# Patient Record
Sex: MALE | Race: BLACK OR AFRICAN AMERICAN | Employment: FULL TIME | URBAN - METROPOLITAN AREA
[De-identification: names, ages, dates, MRNs, and addresses within clinical notes are randomized per-mention and may not be internally consistent; named-entity substitution may affect disease eponyms.]

---

## 2019-10-31 ENCOUNTER — HOSPITAL ENCOUNTER (EMERGENCY)
Age: 59
Discharge: HOME | End: 2019-11-01
Attending: EMERGENCY MEDICINE
Payer: COMMERCIAL

## 2019-10-31 VITALS
OXYGEN SATURATION: 96 % | SYSTOLIC BLOOD PRESSURE: 132 MMHG | BODY MASS INDEX: 24.87 KG/M2 | DIASTOLIC BLOOD PRESSURE: 72 MMHG | HEART RATE: 100 BPM | TEMPERATURE: 99.5 F | WEIGHT: 193.78 LBS | RESPIRATION RATE: 16 BRPM | HEIGHT: 74 IN

## 2019-10-31 DIAGNOSIS — M54.50 ACUTE EXACERBATION OF CHRONIC LOW BACK PAIN: Primary | ICD-10-CM

## 2019-10-31 DIAGNOSIS — G89.29 ACUTE EXACERBATION OF CHRONIC LOW BACK PAIN: Primary | ICD-10-CM

## 2019-10-31 PROCEDURE — 96374 THER/PROPH/DIAG INJ IV PUSH: CPT

## 2019-10-31 PROCEDURE — 99283 EMERGENCY DEPT VISIT LOW MDM: CPT

## 2019-10-31 PROCEDURE — 96372 THER/PROPH/DIAG INJ SC/IM: CPT

## 2019-10-31 PROCEDURE — 6360000002 HC RX W HCPCS: Performed by: EMERGENCY MEDICINE

## 2019-10-31 PROCEDURE — 96375 TX/PRO/DX INJ NEW DRUG ADDON: CPT

## 2019-10-31 RX ORDER — DEXAMETHASONE SODIUM PHOSPHATE 4 MG/ML
8 INJECTION, SOLUTION INTRA-ARTICULAR; INTRALESIONAL; INTRAMUSCULAR; INTRAVENOUS; SOFT TISSUE ONCE
Status: COMPLETED | OUTPATIENT
Start: 2019-10-31 | End: 2019-10-31

## 2019-10-31 RX ORDER — TRAMADOL HYDROCHLORIDE 50 MG/1
50 TABLET ORAL PRN
COMMUNITY
Start: 2019-10-03 | End: 2022-05-27

## 2019-10-31 RX ORDER — ONDANSETRON 2 MG/ML
4 INJECTION INTRAMUSCULAR; INTRAVENOUS ONCE
Status: COMPLETED | OUTPATIENT
Start: 2019-10-31 | End: 2019-10-31

## 2019-10-31 RX ORDER — LISINOPRIL 20 MG/1
20 TABLET ORAL DAILY
COMMUNITY
Start: 2019-10-11 | End: 2022-05-27

## 2019-10-31 RX ORDER — OXYCODONE HYDROCHLORIDE AND ACETAMINOPHEN 5; 325 MG/1; MG/1
1 TABLET ORAL EVERY 6 HOURS PRN
Qty: 28 TABLET | Refills: 0 | Status: SHIPPED | OUTPATIENT
Start: 2019-10-31 | End: 2019-11-07

## 2019-10-31 RX ADMIN — HYDROMORPHONE HYDROCHLORIDE 1 MG: 1 INJECTION, SOLUTION INTRAMUSCULAR; INTRAVENOUS; SUBCUTANEOUS at 23:11

## 2019-10-31 RX ADMIN — DEXAMETHASONE SODIUM PHOSPHATE 8 MG: 4 INJECTION, SOLUTION INTRAMUSCULAR; INTRAVENOUS at 23:11

## 2019-10-31 RX ADMIN — ONDANSETRON 4 MG: 2 INJECTION INTRAMUSCULAR; INTRAVENOUS at 23:11

## 2019-10-31 SDOH — HEALTH STABILITY: MENTAL HEALTH: HOW OFTEN DO YOU HAVE A DRINK CONTAINING ALCOHOL?: NEVER

## 2019-10-31 ASSESSMENT — PAIN SCALES - GENERAL
PAINLEVEL_OUTOF10: 6
PAINLEVEL_OUTOF10: 5
PAINLEVEL_OUTOF10: 10
PAINLEVEL_OUTOF10: 10

## 2019-10-31 ASSESSMENT — PAIN DESCRIPTION - ORIENTATION: ORIENTATION: LOWER

## 2019-10-31 ASSESSMENT — PAIN DESCRIPTION - DESCRIPTORS: DESCRIPTORS: ACHING;SPASM

## 2019-10-31 ASSESSMENT — PAIN DESCRIPTION - PAIN TYPE
TYPE: ACUTE PAIN;CHRONIC PAIN
TYPE: CHRONIC PAIN

## 2019-10-31 ASSESSMENT — PAIN DESCRIPTION - LOCATION
LOCATION: BACK
LOCATION: BACK

## 2019-10-31 ASSESSMENT — PAIN DESCRIPTION - FREQUENCY: FREQUENCY: INTERMITTENT

## 2021-10-15 PROBLEM — M54.16 LUMBAR RADICULITIS: Status: ACTIVE | Noted: 2017-11-14

## 2022-08-09 ENCOUNTER — OFFICE VISIT (OUTPATIENT)
Dept: PAIN MANAGEMENT | Age: 62
End: 2022-08-09
Payer: COMMERCIAL

## 2022-08-09 VITALS
HEART RATE: 68 BPM | BODY MASS INDEX: 27.34 KG/M2 | SYSTOLIC BLOOD PRESSURE: 124 MMHG | DIASTOLIC BLOOD PRESSURE: 74 MMHG | WEIGHT: 213 LBS | HEIGHT: 74 IN

## 2022-08-09 DIAGNOSIS — M54.16 LUMBAR RADICULOPATHY: Primary | ICD-10-CM

## 2022-08-09 DIAGNOSIS — Z51.81 ENCOUNTER FOR THERAPEUTIC DRUG MONITORING: ICD-10-CM

## 2022-08-09 DIAGNOSIS — G89.4 CHRONIC PAIN SYNDROME: ICD-10-CM

## 2022-08-09 DIAGNOSIS — Z79.891 ENCOUNTER FOR LONG-TERM OPIATE ANALGESIC USE: ICD-10-CM

## 2022-08-09 PROCEDURE — 99204 OFFICE O/P NEW MOD 45 MIN: CPT | Performed by: NURSE PRACTITIONER

## 2022-08-09 RX ORDER — HYDROCODONE BITARTRATE AND ACETAMINOPHEN 7.5; 325 MG/1; MG/1
1 TABLET ORAL 2 TIMES DAILY
Qty: 56 TABLET | Refills: 0 | Status: SHIPPED | OUTPATIENT
Start: 2022-08-09 | End: 2022-09-06

## 2022-08-09 RX ORDER — AMITRIPTYLINE HYDROCHLORIDE 25 MG/1
25 TABLET, FILM COATED ORAL NIGHTLY
Qty: 30 TABLET | Refills: 0 | Status: SHIPPED | OUTPATIENT
Start: 2022-08-09 | End: 2022-09-09 | Stop reason: SDUPTHER

## 2022-08-09 ASSESSMENT — ENCOUNTER SYMPTOMS
BACK PAIN: 1
SHORTNESS OF BREATH: 0
CONSTIPATION: 0
CHEST TIGHTNESS: 0

## 2022-08-09 NOTE — PROGRESS NOTES
Genitourinary:  Negative for decreased urine volume. Musculoskeletal:  Positive for arthralgias, back pain and gait problem. Negative for neck pain and neck stiffness. Neurological:  Negative for dizziness, speech difficulty, weakness and headaches. Psychiatric/Behavioral:  Positive for sleep disturbance. Negative for self-injury and suicidal ideas. The patient is not nervous/anxious. Physical Exam  Constitutional:       Appearance: Normal appearance. HENT:      Head: Normocephalic and atraumatic. Mouth/Throat:      Mouth: Mucous membranes are moist.      Pharynx: Oropharynx is clear. Eyes:      General: No scleral icterus. Pupils: Pupils are equal, round, and reactive to light. Cardiovascular:      Rate and Rhythm: Normal rate and regular rhythm. Pulses: Normal pulses. Heart sounds: Normal heart sounds. No murmur heard. Pulmonary:      Effort: Pulmonary effort is normal.      Breath sounds: Normal breath sounds. Abdominal:      General: Abdomen is flat. Bowel sounds are normal.      Palpations: Abdomen is soft. Musculoskeletal:         General: Tenderness (L3-L5 facets/lumbar paraspinals) present. No swelling. Cervical back: Normal range of motion and neck supple. No tenderness. Right lower leg: No edema. Left lower leg: No edema. Comments: Limited lumbar and flexion r/t pain   Skin:     General: Skin is warm and dry. Capillary Refill: Capillary refill takes 2 to 3 seconds. Neurological:      General: No focal deficit present. Mental Status: He is alert and oriented to person, place, and time. Mental status is at baseline. Sensory: No sensory deficit. Motor: Weakness (RLE 4/5, LLE 5/5) present. Gait: Gait normal.      Deep Tendon Reflexes: Reflexes normal.   Psychiatric:         Mood and Affect: Mood normal.         Behavior: Behavior normal.         Thought Content:  Thought content normal.         Judgment: Judgment normal. /74   Pulse 68   Ht 6' 1.5\" (1.867 m)   Wt 213 lb (96.6 kg)   BMI 27.72 kg/m²      ASSESSMENT:    1. Lumbar radiculopathy    2. Encounter for therapeutic drug monitoring    3. Chronic pain syndrome    4. Encounter for long-term opiate analgesic use         Lab Results   Component Value Date    WBC 5.3 02/18/2022    HGB 11.4 (L) 02/18/2022    HCT 36.0 (L) 02/18/2022    MCV 73 (L) 02/18/2022     02/18/2022     Lab Results   Component Value Date/Time     02/18/2022 12:00 AM    K 4.2 02/18/2022 12:00 AM     02/18/2022 12:00 AM    CO2 22 02/18/2022 12:00 AM    BUN 8 02/18/2022 12:00 AM    CREATININE 0.93 02/18/2022 12:00 AM    GLUCOSE 84 02/18/2022 12:00 AM    CALCIUM 9.7 02/18/2022 12:00 AM      Lab Results   Component Value Date    TSH 1.180 02/18/2022       IMAGING:  MRI at [facility] 8 months ago, will obtain records     Carol Gonzalez MD - 11/19/2021   Formatting of this note might be different from the original.   HISTORY: chronic pain  Low back pain, unspecified;Spondylosis without myelopathy or radiculopathy, lumbosacral region   COMPARISON: 2017   NOTE:  If there are questions about the content of this report, please contact [facility] radiology by calling [phone]     FINDINGS:   ALIGNMENT:  Unremarkable   BONES:  Unremarkable. No aggressive osseous lesion or fracture   POST-SURGICAL FINDINGS:  None   DISC LEVELS:  Severe disc narrowing and bulky spurring L4-5 and L5-S1   FACET JOINTS:  Degenerative sclerosis throughout the lumbar facet joints   LUMBOSACRAL JUNCTION:  Unremarkable   SACRUM AND SI JOINTS:  Unremarkable   OTHER:  None     IMPRESSION       Severe lower lumbar spondylosis and facet arthrosis   No dynamic instability with flexion or extension    PLAN:   -Chronic opiate treatment protocol was discussed withthe patient, informed consent was obtained.   -Treatment guidelines were discussed and established  -Risks and benefits of narcotics were addressedwith the patient  - Obtainable long term and short term goals of opioid therapy were reviewed, including pain relief, sleep, psychosocial and physical functioning   -CBT techniques- relaxation therapies such as biofeedback, mindfulness based stress reduction, imagery, cognitive restructuring, problem solving discussed with patient   -Obtain urine Toxicology today  -SOAPP score: 2  -ORT: 0  -PHQ-9: 11  -Norco 7.5mg BID PRN pain  -Elavil 25mg tab, 1 tab QHS PRN sleep  -F/U 28 days      Controlled Substances Monitoring:   [place] record was obtained and reviewed  for the last one year and no indicators of drug misuse  were found. Any other controlled substance prescriptions  seen on the record have been accounted for, I am aware of the patient receiving these medications. [place] record will be rechecked as part of office protocol.  Last opiate dose from Dr. Matthew Fung percocet 5mg tabs #21 for 7 days, filled 06/29/2022      SCOTTY Rosas

## 2022-09-08 RX ORDER — AMITRIPTYLINE HYDROCHLORIDE 25 MG/1
TABLET, FILM COATED ORAL
Qty: 30 TABLET | Refills: 0 | OUTPATIENT
Start: 2022-09-08

## 2022-09-09 ENCOUNTER — OFFICE VISIT (OUTPATIENT)
Dept: PAIN MANAGEMENT | Age: 62
End: 2022-09-09
Payer: COMMERCIAL

## 2022-09-09 VITALS
HEART RATE: 57 BPM | BODY MASS INDEX: 28.35 KG/M2 | SYSTOLIC BLOOD PRESSURE: 123 MMHG | OXYGEN SATURATION: 98 % | WEIGHT: 217.8 LBS | DIASTOLIC BLOOD PRESSURE: 63 MMHG

## 2022-09-09 DIAGNOSIS — Z79.891 ENCOUNTER FOR LONG-TERM OPIATE ANALGESIC USE: ICD-10-CM

## 2022-09-09 DIAGNOSIS — Z51.81 ENCOUNTER FOR THERAPEUTIC DRUG MONITORING: ICD-10-CM

## 2022-09-09 DIAGNOSIS — M54.16 LUMBAR RADICULOPATHY: ICD-10-CM

## 2022-09-09 DIAGNOSIS — G89.4 CHRONIC PAIN SYNDROME: ICD-10-CM

## 2022-09-09 PROCEDURE — 99213 OFFICE O/P EST LOW 20 MIN: CPT | Performed by: NURSE PRACTITIONER

## 2022-09-09 RX ORDER — PREGABALIN 75 MG/1
75 CAPSULE ORAL 2 TIMES DAILY
Qty: 60 CAPSULE | Refills: 0 | Status: SHIPPED | OUTPATIENT
Start: 2022-09-09 | End: 2022-10-14 | Stop reason: SDUPTHER

## 2022-09-09 RX ORDER — AMITRIPTYLINE HYDROCHLORIDE 25 MG/1
25 TABLET, FILM COATED ORAL NIGHTLY
Qty: 30 TABLET | Refills: 0 | Status: SHIPPED | OUTPATIENT
Start: 2022-09-09 | End: 2022-10-09

## 2022-09-09 RX ORDER — HYDROCODONE BITARTRATE AND ACETAMINOPHEN 7.5; 325 MG/1; MG/1
1 TABLET ORAL 2 TIMES DAILY
Qty: 56 TABLET | Refills: 0 | Status: SHIPPED | OUTPATIENT
Start: 2022-09-09 | End: 2022-10-07

## 2022-09-09 RX ORDER — NALOXONE HYDROCHLORIDE 4 MG/.1ML
1 SPRAY NASAL PRN
Qty: 1 EACH | Refills: 0 | Status: SHIPPED | OUTPATIENT
Start: 2022-09-09

## 2022-09-09 NOTE — PROGRESS NOTES
Roland Pepeandre  1960  [id number]      HISTORY OF PRESENT ILLNESS: Mr. Ted Larsen is a 65 y.o. male returns for a follow up visit for pain management  He has a diagnosis of   1. Lumbar radiculopathy    2. Encounter for long-term opiate analgesic use    3. Encounter for therapeutic drug monitoring    4. Chronic pain syndrome    . As per Information Obtained from the PADT (Patient Assessment and Documentation Tool)    He complains of pain in the  low back and in the right leg. He rates the pain 8/10 and describes it as sharp, aching, shooting. Current treatment regimen has helped relieve about 20% of the pain. He denies any side effects from the current pain regimen. Patient reports that since the last follow up visit the physical functioning is unchanged, family/social relationships are unchanged, mood is unchanged sleep patterns are unchanged, and that the overall functioning is unchanged. Patient denies misusing/abusing his narcotic pain medications or using any illegal drugs. Upon obtaining medical history from Mr. Russell Courts: Patients list of allergies were reviewed     MEDICATIONS: Mr. Ted Larsen list of medications were reviewed. His current medications are   Outpatient Medications Prior to Visit   Medication Sig Dispense Refill    metoprolol succinate (TOPROL XL) 100 MG extended release tablet Take 1 tablet by mouth daily 90 tablet 0    amLODIPine (NORVASC) 10 MG tablet Take 1 tablet by mouth daily 90 tablet 0    omeprazole (PRILOSEC) 40 MG delayed release capsule Take 1 capsule by mouth every morning (before breakfast) 30 capsule 5    amitriptyline (ELAVIL) 25 MG tablet Take 1 tablet by mouth nightly 30 tablet 0     No facility-administered medications prior to visit. SOCIAL/FAMILY/PAST MEDICAL HISTORY: Mr. Casey Madiha, family and past medical history was reviewed.      REVIEW OF SYSTEMS:    Respiratory: Negative for apnea, chest tightness and shortness of breath or change in baseline breathing. Gastrointestinal: Negative for nausea, vomiting, abdominal pain, diarrhea, constipation, blood in stool and abdominal distention. PHYSICAL EXAM:   Nursing note and vitals reviewed. /63   Pulse 57   Wt 217 lb 12.8 oz (98.8 kg)   SpO2 98%   BMI 28.35 kg/m²   Constitutional: He appears well-developed and well-nourished. No acute distress. Skin: Skin is warm and dry, good turgor. No rash noted. He is not diaphoretic. Cardiovascular: Normal rate, regular rhythm, normal heart sounds, and does not have murmur. Pulmonary/Chest: Effort normal. No respiratory distress. He does not have wheezes in the lung fields. He has no rales. Neurological/Psychiatric:He is alert and oriented to person, place, and time. Coordination is  normal.  His mood isAppropriate and affect is Neutral/Euthymic(normal) . Prescription pain medication monitoring:                  MEDD current = 15              ORT Score = 0              Other Risk factors - stable, content mood              Date of Last Medication Agreement:08/09/2022              Date Naloxone prescribed:09/09/2022              UDT:                          Date of last UDT: 08/09/2022                          Adverse report: No               [place]:                          Checked today: Yes                          Adverse report: No    IMPRESSION:   1. Lumbar radiculopathy    2. Encounter for long-term opiate analgesic use    3. Encounter for therapeutic drug monitoring    4. Chronic pain syndrome        PLAN:  Informed verbal consent was obtained:  -Continue Norco 7.5mg BID   -Add lyrica for radicular pain, history of failing gabapentin related to side effects. -F/U 28 days.      Analgesic Plan:              Continue present regimen:yes              Adjust dose of present analgesic:no              Switch analgesics:no              Add/Adjust concomitant therapy:yes      Current Outpatient Medications   Medication Sig Dispense Refill    metoprolol succinate (TOPROL XL) 100 MG extended release tablet Take 1 tablet by mouth daily 90 tablet 0    amLODIPine (NORVASC) 10 MG tablet Take 1 tablet by mouth daily 90 tablet 0    omeprazole (PRILOSEC) 40 MG delayed release capsule Take 1 capsule by mouth every morning (before breakfast) 30 capsule 5    amitriptyline (ELAVIL) 25 MG tablet Take 1 tablet by mouth nightly 30 tablet 0     No current facility-administered medications for this visit. I will continue his current medication regimen  which is part of the above treatment schedule. It has been helping with Mr. Angelita Nava chronic  medical problems which for this visit include:   Diagnoses of Lumbar radiculopathy, Encounter for long-term opiate analgesic use, Encounter for therapeutic drug monitoring, and Chronic pain syndrome were pertinent to this visit. Risks and benefits of the medications and other alternative treatments  including no treatment were discussed with the patient. The common side effects of these medications were also explained to the patient. Informed verbal consent was obtained. Goals of current treatment regimen include improvement in pain, restoration of functioning- with focus on improvement in physical performance, general activity, work or disability,emotional distress, health care utilization and  decreased medication consumption. Will continue to monitor progress towards achieving/maintaining therapeutic goals with special emphasis on  1. Improvement in perceived interfernce  of pain with ADL's. Ability to do home exercises independently. Ability to do household chores indoor and/or outdoor work and social and leisure activities. Improve psychosocial and physical functioning.   PROGRESSING      He was advised against drinking alcohol with the narcotic pain medicines, advised against driving or handling machinery while adjusting the dose of medicines or if having cognitive  issues related to the current

## 2022-10-14 ENCOUNTER — OFFICE VISIT (OUTPATIENT)
Dept: PAIN MANAGEMENT | Age: 62
End: 2022-10-14
Payer: COMMERCIAL

## 2022-10-14 VITALS
OXYGEN SATURATION: 99 % | WEIGHT: 221.8 LBS | BODY MASS INDEX: 28.87 KG/M2 | HEART RATE: 54 BPM | SYSTOLIC BLOOD PRESSURE: 133 MMHG | DIASTOLIC BLOOD PRESSURE: 67 MMHG

## 2022-10-14 DIAGNOSIS — M54.16 LUMBAR RADICULOPATHY: ICD-10-CM

## 2022-10-14 DIAGNOSIS — Z79.891 ENCOUNTER FOR LONG-TERM OPIATE ANALGESIC USE: ICD-10-CM

## 2022-10-14 DIAGNOSIS — G89.4 CHRONIC PAIN SYNDROME: ICD-10-CM

## 2022-10-14 DIAGNOSIS — Z51.81 ENCOUNTER FOR THERAPEUTIC DRUG MONITORING: ICD-10-CM

## 2022-10-14 PROCEDURE — 99214 OFFICE O/P EST MOD 30 MIN: CPT | Performed by: NURSE PRACTITIONER

## 2022-10-14 RX ORDER — PREGABALIN 75 MG/1
75 CAPSULE ORAL 2 TIMES DAILY
Qty: 60 CAPSULE | Refills: 0 | Status: SHIPPED | OUTPATIENT
Start: 2022-10-14 | End: 2022-11-13

## 2022-10-14 RX ORDER — HYDROCODONE BITARTRATE AND ACETAMINOPHEN 7.5; 325 MG/1; MG/1
1 TABLET ORAL 2 TIMES DAILY
Qty: 56 TABLET | Refills: 0 | Status: SHIPPED | OUTPATIENT
Start: 2022-10-14 | End: 2022-11-11

## 2022-10-14 NOTE — PROGRESS NOTES
Joe Ayleen  1960  [id number]      HISTORY OF PRESENT ILLNESS: Mr. Ciera Daniel is a 65 y.o. male returns for a follow up visit for pain management  He has a diagnosis of   1. Lumbar radiculopathy    2. Chronic pain syndrome    3. Encounter for long-term opiate analgesic use    4. Encounter for therapeutic drug monitoring    . As per Information Obtained from the PADT (Patient Assessment and Documentation Tool)    He complains of pain in the  low back. He rates the pain 7/10 and describes it as sharp, aching, burning. Current treatment regimen has helped relieve about 70% of the pain. He denies any side effects from the current pain regimen. Patient reports that since the last follow up visit the physical functioning is unchanged, family/social relationships are unchanged, mood is better sleep patterns are better, and that the overall functioning is better. Patient denies misusing/abusing his narcotic pain medications or using any illegal drugs. Upon obtaining medical history from Mr. Libertad Rogers: Patients list of allergies were reviewed     MEDICATIONS: Mr. Ciera Daniel list of medications were reviewed. His current medications are   Outpatient Medications Prior to Visit   Medication Sig Dispense Refill    naloxone (NARCAN) 4 MG/0.1ML LIQD nasal spray 1 spray by Nasal route as needed for Opioid Reversal 1 each 0    metoprolol succinate (TOPROL XL) 100 MG extended release tablet Take 1 tablet by mouth daily 90 tablet 0    amLODIPine (NORVASC) 10 MG tablet Take 1 tablet by mouth daily 90 tablet 0    omeprazole (PRILOSEC) 40 MG delayed release capsule Take 1 capsule by mouth every morning (before breakfast) 30 capsule 5    pregabalin (LYRICA) 75 MG capsule Take 1 capsule by mouth 2 times daily for 30 days. 60 capsule 0    amitriptyline (ELAVIL) 25 MG tablet Take 1 tablet by mouth nightly 30 tablet 0     No facility-administered medications prior to visit.        SOCIAL/FAMILY/PAST MEDICAL HISTORY: Mr. Yovana Chisholm, family and past medical history was reviewed. REVIEW OF SYSTEMS:    Respiratory: Negative for apnea, chest tightness and shortness of breath or change in baseline breathing. Gastrointestinal: Negative for nausea, vomiting, abdominal pain, diarrhea, constipation, blood in stool and abdominal distention. PHYSICAL EXAM:   Nursing note and vitals reviewed. /67   Pulse 54   Wt 221 lb 12.8 oz (100.6 kg)   SpO2 99%   BMI 28.87 kg/m²   Constitutional: He appears well-developed and well-nourished. No acute distress. Skin: Skin is warm and dry, good turgor. No rash noted. He is not diaphoretic. Cardiovascular: Normal rate, regular rhythm, normal heart sounds, and does not have murmur. Pulmonary/Chest: Effort normal. No respiratory distress. He does not have wheezes in the lung fields. He has no rales. Neurological/Psychiatric:He is alert and oriented to person, place, and time. Coordination is  normal.  His mood isAppropriate and affect is Neutral/Euthymic(normal) . Prescription pain medication monitoring:                  MEDD current = 15              ORT Score = 0              Other Risk factors - improved mood, no red flags              Date of Last Medication Agreement: 08/09/2022              Date Naloxone prescribed:09/09/2022              UDT:                          Date of last UDT: 08/09/2022                          Adverse report: No               [place]:                          Checked today: Yes                          Adverse report: No    IMPRESSION:   1. Lumbar radiculopathy    2. Chronic pain syndrome    3. Encounter for long-term opiate analgesic use    4. Encounter for therapeutic drug monitoring        PLAN:  Informed verbal consent was obtained:  -Continue and refill Norco 7.5mg BID   -He states lyrica is helping, dont see fill on [place]. -Refill lyrica 75mg x2-he denies SE  -F/U 5 weeks.  He takes 1- 1.5 tabs per day      Analgesic Plan: Continue present regimen:yes              Adjust dose of present analgesic:no              Switch analgesics:no              Add/Adjust concomitant therapy:no      Current Outpatient Medications   Medication Sig Dispense Refill    naloxone (NARCAN) 4 MG/0.1ML LIQD nasal spray 1 spray by Nasal route as needed for Opioid Reversal 1 each 0    metoprolol succinate (TOPROL XL) 100 MG extended release tablet Take 1 tablet by mouth daily 90 tablet 0    amLODIPine (NORVASC) 10 MG tablet Take 1 tablet by mouth daily 90 tablet 0    omeprazole (PRILOSEC) 40 MG delayed release capsule Take 1 capsule by mouth every morning (before breakfast) 30 capsule 5    pregabalin (LYRICA) 75 MG capsule Take 1 capsule by mouth 2 times daily for 30 days. 60 capsule 0    amitriptyline (ELAVIL) 25 MG tablet Take 1 tablet by mouth nightly 30 tablet 0     No current facility-administered medications for this visit. I will continue his current medication regimen  which is part of the above treatment schedule. It has been helping with Mr. Danyel Joseph chronic  medical problems which for this visit include:   Diagnoses of Lumbar radiculopathy, Chronic pain syndrome, Encounter for long-term opiate analgesic use, and Encounter for therapeutic drug monitoring were pertinent to this visit. Risks and benefits of the medications and other alternative treatments  including no treatment were discussed with the patient. The common side effects of these medications were also explained to the patient. Informed verbal consent was obtained. Goals of current treatment regimen include improvement in pain, restoration of functioning- with focus on improvement in physical performance, general activity, work or disability,emotional distress, health care utilization and  decreased medication consumption. Will continue to monitor progress towards achieving/maintaining therapeutic goals with special emphasis on  1.  Improvement in perceived interfernce  of pain with ADL's. Ability to do home exercises independently. Ability to do household chores indoor and/or outdoor work and social and leisure activities. Improve psychosocial and physical functioning. - he is showing progression towards this treatment goal with the current regimen. He was advised against drinking alcohol with the narcotic pain medicines, advised against driving or handling machinery while adjusting the dose of medicines or if having cognitive  issues related to the current medications. Risk of overdose and death, if medicines not taken as prescribed, were also discussed. If the patient develops new symptoms or if the symptoms worsen, the patient should call the office. While transcribing every attempt was made to maintain the accuracy of the note in terms of it's contents,there may have been some errors made inadvertently. Thank you for allowing me to participate in the care of this patient.     Bin Underwood NP-MYLA    Cc: Marcella Morin MD

## 2022-10-17 RX ORDER — AMITRIPTYLINE HYDROCHLORIDE 25 MG/1
TABLET, FILM COATED ORAL
Qty: 30 TABLET | Refills: 0 | OUTPATIENT
Start: 2022-10-17

## 2022-11-18 ENCOUNTER — OFFICE VISIT (OUTPATIENT)
Dept: PAIN MANAGEMENT | Age: 62
End: 2022-11-18
Payer: COMMERCIAL

## 2022-11-18 VITALS
BODY MASS INDEX: 28.53 KG/M2 | SYSTOLIC BLOOD PRESSURE: 153 MMHG | DIASTOLIC BLOOD PRESSURE: 79 MMHG | OXYGEN SATURATION: 98 % | HEART RATE: 53 BPM | WEIGHT: 219.2 LBS

## 2022-11-18 DIAGNOSIS — Z79.891 ENCOUNTER FOR LONG-TERM OPIATE ANALGESIC USE: ICD-10-CM

## 2022-11-18 DIAGNOSIS — G89.4 CHRONIC PAIN SYNDROME: ICD-10-CM

## 2022-11-18 DIAGNOSIS — M54.16 LUMBAR RADICULOPATHY: ICD-10-CM

## 2022-11-18 DIAGNOSIS — Z51.81 ENCOUNTER FOR THERAPEUTIC DRUG MONITORING: ICD-10-CM

## 2022-11-18 PROCEDURE — 99213 OFFICE O/P EST LOW 20 MIN: CPT | Performed by: NURSE PRACTITIONER

## 2022-11-18 RX ORDER — PREGABALIN 75 MG/1
75 CAPSULE ORAL 2 TIMES DAILY
Qty: 60 CAPSULE | Refills: 1 | Status: SHIPPED | OUTPATIENT
Start: 2022-11-18 | End: 2022-12-18

## 2022-11-18 RX ORDER — HYDROCODONE BITARTRATE AND ACETAMINOPHEN 7.5; 325 MG/1; MG/1
1 TABLET ORAL 2 TIMES DAILY
Qty: 56 TABLET | Refills: 0 | Status: SHIPPED | OUTPATIENT
Start: 2022-11-18 | End: 2022-12-16

## 2022-11-18 NOTE — PROGRESS NOTES
Brandt Phi  1960  [id number]      HISTORY OF PRESENT ILLNESS: Mr. Prasanth Dixon is a 65 y.o. male returns for a follow up visit for pain management  He has a diagnosis of   1. Lumbar radiculopathy    2. Encounter for therapeutic drug monitoring    3. Chronic pain syndrome    4. Encounter for long-term opiate analgesic use    . As per Information Obtained from the PADT (Patient Assessment and Documentation Tool)    He complains of pain in the  low back. He rates the pain 7/10 and describes it as dull, aching. Current treatment regimen has helped relieve about 70% of the pain. He denies any side effects from the current pain regimen. Patient reports that since the last follow up visit the physical functioning is unchanged, family/social relationships are unchanged, mood is unchanged sleep patterns are better, and that the overall functioning is better. Patient denies misusing/abusing his narcotic pain medications or using any illegal drugs. Upon obtaining medical history from Mr. Marshall Slight: Patients list of allergies were reviewed     MEDICATIONS: Mr. Prasanth Dixon list of medications were reviewed. His current medications are   Outpatient Medications Prior to Visit   Medication Sig Dispense Refill    hydrOXYzine HCl (ATARAX) 25 MG tablet Take 1 tablet by mouth every 8 hours as needed for Itching 30 tablet 0    naloxone (NARCAN) 4 MG/0.1ML LIQD nasal spray 1 spray by Nasal route as needed for Opioid Reversal 1 each 0    metoprolol succinate (TOPROL XL) 100 MG extended release tablet Take 1 tablet by mouth daily 90 tablet 0    amLODIPine (NORVASC) 10 MG tablet Take 1 tablet by mouth daily 90 tablet 0    omeprazole (PRILOSEC) 40 MG delayed release capsule Take 1 capsule by mouth every morning (before breakfast) 30 capsule 5    pregabalin (LYRICA) 75 MG capsule Take 1 capsule by mouth 2 times daily for 30 days.  60 capsule 0    amitriptyline (ELAVIL) 25 MG tablet Take 1 tablet by mouth nightly 30 tablet 0     No facility-administered medications prior to visit. SOCIAL/FAMILY/PAST MEDICAL HISTORY: Mr. Sapphire Sheriff, family and past medical history was reviewed. REVIEW OF SYSTEMS:    Respiratory: Negative for apnea, chest tightness and shortness of breath or change in baseline breathing. Gastrointestinal: Negative for nausea, vomiting, abdominal pain, diarrhea, constipation, blood in stool and abdominal distention. PHYSICAL EXAM:   Nursing note and vitals reviewed. BP (!) 153/79   Pulse 53   Wt 219 lb 3.2 oz (99.4 kg)   SpO2 98%   BMI 28.53 kg/m²   Constitutional: He appears well-developed and well-nourished. No acute distress. Skin: Skin is warm and dry, good turgor. No rash noted. He is not diaphoretic. Cardiovascular: Normal rate, regular rhythm, normal heart sounds, and does not have murmur. Pulmonary/Chest: Effort normal. No respiratory distress. He does not have wheezes in the lung fields. He has no rales. Neurological/Psychiatric:He is alert and oriented to person, place, and time. Coordination is  normal.  His mood isAppropriate and affect is Neutral/Euthymic(normal) . Prescription pain medication monitoring:                  MEDD current = 15              ORT Score = 0              Other Risk factors - stable, calm mood              Date of Last Medication Agreement: 08/09/2022              Date Naloxone prescribed:09/09/2022              UDT:                          Date of last UDT: 08/09/2022                          Adverse report: No              OARRS:                          Checked today: Yes                          Adverse report: No    IMPRESSION:   1. Lumbar radiculopathy    2. Encounter for therapeutic drug monitoring    3. Chronic pain syndrome    4. Encounter for long-term opiate analgesic use        PLAN:  Informed verbal consent was obtained:  -continue Norco 5mg tabs QD-BID  -lyrica is helping w/o SE.  Refill lyrica 75mg BID   -F/U 5 weeks for continued opiate monitoring and reassessment    Analgesic Plan:              Continue present regimen:yes              Adjust dose of present analgesic:no              Switch analgesics:no              Add/Adjust concomitant therapy:no      Current Outpatient Medications   Medication Sig Dispense Refill    hydrOXYzine HCl (ATARAX) 25 MG tablet Take 1 tablet by mouth every 8 hours as needed for Itching 30 tablet 0    naloxone (NARCAN) 4 MG/0.1ML LIQD nasal spray 1 spray by Nasal route as needed for Opioid Reversal 1 each 0    metoprolol succinate (TOPROL XL) 100 MG extended release tablet Take 1 tablet by mouth daily 90 tablet 0    amLODIPine (NORVASC) 10 MG tablet Take 1 tablet by mouth daily 90 tablet 0    omeprazole (PRILOSEC) 40 MG delayed release capsule Take 1 capsule by mouth every morning (before breakfast) 30 capsule 5    pregabalin (LYRICA) 75 MG capsule Take 1 capsule by mouth 2 times daily for 30 days. 60 capsule 0    amitriptyline (ELAVIL) 25 MG tablet Take 1 tablet by mouth nightly 30 tablet 0     No current facility-administered medications for this visit. I will continue his current medication regimen  which is part of the above treatment schedule. It has been helping with Mr. Eric Ramon chronic  medical problems which for this visit include:   Diagnoses of Lumbar radiculopathy, Encounter for therapeutic drug monitoring, Chronic pain syndrome, and Encounter for long-term opiate analgesic use were pertinent to this visit. Risks and benefits of the medications and other alternative treatments  including no treatment were discussed with the patient. The common side effects of these medications were also explained to the patient. Informed verbal consent was obtained.    Goals of current treatment regimen include improvement in pain, restoration of functioning- with focus on improvement in physical performance, general activity, work or disability,emotional distress, health care utilization and decreased medication consumption. Will continue to monitor progress towards achieving/maintaining therapeutic goals with special emphasis on  1. Improvement in perceived interfernce  of pain with ADL's. Ability to do home exercises independently. Ability to do household chores indoor and/or outdoor work and social and leisure activities. Improve psychosocial and physical functioning. - he is maintaining his treatment goal with the current regimen. He was advised against drinking alcohol with the narcotic pain medicines, advised against driving or handling machinery while adjusting the dose of medicines or if having cognitive  issues related to the current medications. Risk of overdose and death, if medicines not taken as prescribed, were also discussed. If the patient develops new symptoms or if the symptoms worsen, the patient should call the office. While transcribing every attempt was made to maintain the accuracy of the note in terms of it's contents,there may have been some errors made inadvertently. Thank you for allowing me to participate in the care of this patient.     Gavin Vang NP-C    Cc: Yuliana Love MD

## 2022-12-20 ENCOUNTER — OFFICE VISIT (OUTPATIENT)
Dept: PAIN MANAGEMENT | Age: 62
End: 2022-12-20

## 2022-12-20 ENCOUNTER — TELEPHONE (OUTPATIENT)
Dept: PAIN MANAGEMENT | Age: 62
End: 2022-12-20

## 2022-12-20 VITALS
OXYGEN SATURATION: 98 % | SYSTOLIC BLOOD PRESSURE: 146 MMHG | DIASTOLIC BLOOD PRESSURE: 72 MMHG | HEART RATE: 53 BPM | WEIGHT: 219.2 LBS | BODY MASS INDEX: 28.53 KG/M2

## 2022-12-20 DIAGNOSIS — Z51.81 ENCOUNTER FOR THERAPEUTIC DRUG MONITORING: ICD-10-CM

## 2022-12-20 DIAGNOSIS — Z79.891 ENCOUNTER FOR LONG-TERM OPIATE ANALGESIC USE: ICD-10-CM

## 2022-12-20 DIAGNOSIS — M54.16 LUMBAR RADICULOPATHY: ICD-10-CM

## 2022-12-20 DIAGNOSIS — G89.4 CHRONIC PAIN SYNDROME: ICD-10-CM

## 2022-12-20 PROCEDURE — 99213 OFFICE O/P EST LOW 20 MIN: CPT | Performed by: NURSE PRACTITIONER

## 2022-12-20 RX ORDER — HYDROCODONE BITARTRATE AND ACETAMINOPHEN 7.5; 325 MG/1; MG/1
1 TABLET ORAL 2 TIMES DAILY
Qty: 56 TABLET | Refills: 0 | Status: SHIPPED | OUTPATIENT
Start: 2022-12-20 | End: 2023-01-17

## 2022-12-20 RX ORDER — PREGABALIN 75 MG/1
75 CAPSULE ORAL 2 TIMES DAILY
Qty: 60 CAPSULE | Refills: 1 | Status: SHIPPED | OUTPATIENT
Start: 2022-12-20 | End: 2023-01-19

## 2022-12-20 RX ORDER — METHYLPREDNISOLONE 4 MG/1
TABLET ORAL
Qty: 21 TABLET | Refills: 0 | Status: SHIPPED | OUTPATIENT
Start: 2022-12-20 | End: 2022-12-21

## 2022-12-20 NOTE — PROGRESS NOTES
Mannie Chance  1960  [id number]      HISTORY OF PRESENT ILLNESS: Mr. Jah Hamilton is a 65 y.o. male returns for a follow up visit for pain management  He has a diagnosis of   1. Lumbar radiculopathy    2. Encounter for long-term opiate analgesic use    3. Encounter for therapeutic drug monitoring    4. Chronic pain syndrome    . As per Information Obtained from the PADT (Patient Assessment and Documentation Tool)    He complains of pain in the  low back and right leg intermittently. He rates the pain 9/10 and describes it as sharp, shooting. Current treatment regimen has helped relieve about 30% of the pain. He denies any side effects from the current pain regimen. Patient reports that since the last follow up visit the physical functioning is unchanged, family/social relationships are unchanged, mood is unchanged sleep patterns are better, and that the overall functioning is better. Patient denies misusing/abusing his narcotic pain medications or using any illegal drugs. Upon obtaining medical history from Mr. Herminia Buenrostro: Patients list of allergies were reviewed     MEDICATIONS: Mr. Jah Hamilton list of medications were reviewed. His current medications are   Outpatient Medications Prior to Visit   Medication Sig Dispense Refill    pregabalin (LYRICA) 75 MG capsule Take 1 capsule by mouth 2 times daily for 30 days. 60 capsule 1    naloxone (NARCAN) 4 MG/0.1ML LIQD nasal spray 1 spray by Nasal route as needed for Opioid Reversal 1 each 0    metoprolol succinate (TOPROL XL) 100 MG extended release tablet Take 1 tablet by mouth daily 90 tablet 0    amLODIPine (NORVASC) 10 MG tablet Take 1 tablet by mouth daily 90 tablet 0    omeprazole (PRILOSEC) 40 MG delayed release capsule Take 1 capsule by mouth every morning (before breakfast) 30 capsule 5    amitriptyline (ELAVIL) 25 MG tablet Take 1 tablet by mouth nightly 30 tablet 0     No facility-administered medications prior to visit. SOCIAL/FAMILY/PAST MEDICAL HISTORY: Mr. Yovana Chisholm, family and past medical history was reviewed. REVIEW OF SYSTEMS:    Respiratory: Negative for apnea, chest tightness and shortness of breath or change in baseline breathing. Gastrointestinal: Negative for nausea, vomiting, abdominal pain, diarrhea, constipation, blood in stool and abdominal distention. PHYSICAL EXAM:   Nursing note and vitals reviewed. BP (!) 146/72   Pulse 53   Wt 219 lb 3.2 oz (99.4 kg)   SpO2 98%   BMI 28.53 kg/m²   Constitutional: He appears well-developed and well-nourished. No acute distress. Skin: Skin is warm and dry, good turgor. No rash noted. He is not diaphoretic. Cardiovascular: Normal rate, regular rhythm, normal heart sounds, and does not have murmur. Pulmonary/Chest: Effort normal. No respiratory distress. He does not have wheezes in the lung fields. He has no rales. Neurological/Psychiatric:He is alert and oriented to person, place, and time. Coordination is  normal.  His mood isAppropriate and affect is Neutral/Euthymic(normal) . Prescription pain medication monitoring:                  MEDD current = 15              ORT Score = 0              Other Risk factors - stable mood              Date of Last Medication Agreement: 08/09/2022              Date Naloxone prescribed:09/09/2022              UDT:                          Date of last UDT: 08/09/2022                          Adverse report: No              OARRS:                          Checked today: Yes                          Adverse report: No    IMPRESSION:   1. Lumbar radiculopathy    2. Encounter for long-term opiate analgesic use    3. Encounter for therapeutic drug monitoring    4.  Chronic pain syndrome        PLAN:  Informed verbal consent was obtained:  -medrol dose pack for flare of low back radicular pain into right leg  -continue and refill Norco 7.5mg tabs QD-BID  -refill lyrica 75mg BID  -random UDS today for monitoring -f/u 5 weeks    Analgesic Plan:              Continue present regimen:yes              Adjust dose of present analgesic:no              Switch analgesics:no              Add/Adjust concomitant therapy:no      Current Outpatient Medications   Medication Sig Dispense Refill    pregabalin (LYRICA) 75 MG capsule Take 1 capsule by mouth 2 times daily for 30 days. 60 capsule 1    naloxone (NARCAN) 4 MG/0.1ML LIQD nasal spray 1 spray by Nasal route as needed for Opioid Reversal 1 each 0    metoprolol succinate (TOPROL XL) 100 MG extended release tablet Take 1 tablet by mouth daily 90 tablet 0    amLODIPine (NORVASC) 10 MG tablet Take 1 tablet by mouth daily 90 tablet 0    omeprazole (PRILOSEC) 40 MG delayed release capsule Take 1 capsule by mouth every morning (before breakfast) 30 capsule 5    amitriptyline (ELAVIL) 25 MG tablet Take 1 tablet by mouth nightly 30 tablet 0     No current facility-administered medications for this visit. I will continue his current medication regimen  which is part of the above treatment schedule. It has been helping with Mr. Jacqueline Rainey chronic  medical problems which for this visit include:   Diagnoses of Lumbar radiculopathy, Encounter for long-term opiate analgesic use, Encounter for therapeutic drug monitoring, and Chronic pain syndrome were pertinent to this visit. Risks and benefits of the medications and other alternative treatments  including no treatment were discussed with the patient. The common side effects of these medications were also explained to the patient. Informed verbal consent was obtained. Goals of current treatment regimen include improvement in pain, restoration of functioning- with focus on improvement in physical performance, general activity, work or disability,emotional distress, health care utilization and  decreased medication consumption. Will continue to monitor progress towards achieving/maintaining therapeutic goals with special emphasis on  1. Improvement in perceived interfernce  of pain with ADL's. Ability to do home exercises independently. Ability to do household chores indoor and/or outdoor work and social and leisure activities. Improve psychosocial and physical functioning. - he is showing progression towards this treatment goal with the current regimen    He was advised against drinking alcohol with the narcotic pain medicines, advised against driving or handling machinery while adjusting the dose of medicines or if having cognitive  issues related to the current medications. Risk of overdose and death, if medicines not taken as prescribed, were also discussed. If the patient develops new symptoms or if the symptoms worsen, the patient should call the office. While transcribing every attempt was made to maintain the accuracy of the note in terms of it's contents,there may have been some errors made inadvertently. Thank you for allowing me to participate in the care of this patient.     Flora Alvarez NP-C    Cc: Gemma Jerez MD

## 2022-12-20 NOTE — TELEPHONE ENCOUNTER
Submitted PA for HYDROCODONE  Via CM Key: [id number] STATUS: NOT SENT. I need a insurance entered in [facility] before I can send PA. If this requires a response please respond to the pool ( [phone] [facility]). Thank you please advise patient.

## 2023-02-03 ENCOUNTER — OFFICE VISIT (OUTPATIENT)
Dept: PAIN MANAGEMENT | Age: 63
End: 2023-02-03

## 2023-02-03 VITALS
OXYGEN SATURATION: 98 % | WEIGHT: 222.2 LBS | DIASTOLIC BLOOD PRESSURE: 73 MMHG | HEART RATE: 48 BPM | SYSTOLIC BLOOD PRESSURE: 128 MMHG | BODY MASS INDEX: 28.92 KG/M2

## 2023-02-03 DIAGNOSIS — G89.4 CHRONIC PAIN SYNDROME: ICD-10-CM

## 2023-02-03 DIAGNOSIS — M54.16 LUMBAR RADICULOPATHY: ICD-10-CM

## 2023-02-03 DIAGNOSIS — Z79.891 ENCOUNTER FOR LONG-TERM OPIATE ANALGESIC USE: ICD-10-CM

## 2023-02-03 DIAGNOSIS — Z51.81 ENCOUNTER FOR THERAPEUTIC DRUG MONITORING: ICD-10-CM

## 2023-02-03 PROCEDURE — 99213 OFFICE O/P EST LOW 20 MIN: CPT | Performed by: NURSE PRACTITIONER

## 2023-02-03 RX ORDER — HYDROCODONE BITARTRATE AND ACETAMINOPHEN 7.5; 325 MG/1; MG/1
1 TABLET ORAL 2 TIMES DAILY PRN
Qty: 56 TABLET | Refills: 0 | Status: SHIPPED | OUTPATIENT
Start: 2023-02-03 | End: 2023-03-03

## 2023-02-03 RX ORDER — PREGABALIN 75 MG/1
75 CAPSULE ORAL 2 TIMES DAILY
Qty: 60 CAPSULE | Refills: 1 | Status: SHIPPED | OUTPATIENT
Start: 2023-02-03 | End: 2023-03-05

## 2023-02-03 NOTE — PROGRESS NOTES
Wayne Ekaterina  1960  [id number]      HISTORY OF PRESENT ILLNESS: Mr. Beverly Jones is a 65 y.o. male returns for a follow up visit for pain management  He has a diagnosis of   1. Lumbar radiculopathy    2. Chronic pain syndrome    3. Encounter for long-term opiate analgesic use    4. Encounter for therapeutic drug monitoring    . As per Information Obtained from the PADT (Patient Assessment and Documentation Tool)    He complains of pain in the lower back, right upper leg, right lower leg. He rates the pain 8/10 and describes it as sharp. Current treatment regimen has helped relieve about 70% of the pain. He denies any side effects from the current pain regimen. Patient reports that since the last follow up visit the physical functioning is unchanged, family/social relationships are unchanged, mood is better sleep patterns are better, and that the overall functioning is unchanged. Patient denies misusing/abusing his narcotic pain medications or using any illegal drugs. Upon obtaining medical history from Mr. Jeremias Davalos: Patients list of allergies were reviewed     MEDICATIONS: Mr. Beverly Jones list of medications were reviewed. His current medications are   Outpatient Medications Prior to Visit   Medication Sig Dispense Refill    metoprolol succinate (TOPROL XL) 100 MG extended release tablet Take 1 tablet by mouth daily 90 tablet 0    amLODIPine (NORVASC) 5 MG tablet Take 1 tablet by mouth daily 90 tablet 0    fluticasone (FLONASE) 50 MCG/ACT nasal spray 2 sprays by Each Nostril route daily 16 g 5    pregabalin (LYRICA) 75 MG capsule Take 1 capsule by mouth 2 times daily for 30 days. 60 capsule 1     No facility-administered medications prior to visit. SOCIAL/FAMILY/PAST MEDICAL HISTORY: Mr. Geoffrey Heck, family and past medical history was reviewed. REVIEW OF SYSTEMS:    Respiratory: Negative for apnea, chest tightness and shortness of breath or change in baseline breathing. Gastrointestinal: Negative for nausea, vomiting, abdominal pain, diarrhea, constipation, blood in stool and abdominal distention. PHYSICAL EXAM:   Nursing note and vitals reviewed. There were no vitals taken for this visit. Constitutional: He appears well-developed and well-nourished. No acute distress. Skin: Skin is warm and dry, good turgor. No rash noted. He is not diaphoretic. Cardiovascular: Normal rate, regular rhythm, normal heart sounds, and does not have murmur. Pulmonary/Chest: Effort normal. No respiratory distress. He does not have wheezes in the lung fields. He has no rales. Neurological/Psychiatric:He is alert and oriented to person, place, and time. Coordination is  normal.  His mood isAppropriate and affect is Neutral/Euthymic(normal) . Prescription pain medication monitoring:                  MEDD current = 15              ORT Score = 0              Other Risk factors - stable mood               Date of Last Medication Agreement: 08/09/2022              Date Naloxone prescribed:09/09/2022              UDT:                          Date of last UDT: 12/20/2022                          Adverse report: No              OARRS:                          Checked today: Yes                          Adverse report: No    IMPRESSION:   1. Lumbar radiculopathy    2. Chronic pain syndrome    3. Encounter for long-term opiate analgesic use    4.  Encounter for therapeutic drug monitoring        PLAN:  Informed verbal consent was obtained:  -continue and refill Norco 7.5mg 1-2 tabs per day PRN   -refill lyrica, he takes BID w/o SE  -f/u 28 days for reassessment     Analgesic Plan:              Continue present regimen:yes              Adjust dose of present analgesic:no              Switch analgesics:no              Add/Adjust concomitant therapy:no      Current Outpatient Medications   Medication Sig Dispense Refill    metoprolol succinate (TOPROL XL) 100 MG extended release tablet Take 1 tablet by mouth daily 90 tablet 0    amLODIPine (NORVASC) 5 MG tablet Take 1 tablet by mouth daily 90 tablet 0    fluticasone (FLONASE) 50 MCG/ACT nasal spray 2 sprays by Each Nostril route daily 16 g 5    pregabalin (LYRICA) 75 MG capsule Take 1 capsule by mouth 2 times daily for 30 days. 60 capsule 1     No current facility-administered medications for this visit. I will continue his current medication regimen  which is part of the above treatment schedule. It has been helping with Mr. Danyel Joseph chronic  medical problems which for this visit include:   Diagnoses of Lumbar radiculopathy, Chronic pain syndrome, Encounter for long-term opiate analgesic use, and Encounter for therapeutic drug monitoring were pertinent to this visit. Risks and benefits of the medications and other alternative treatments  including no treatment were discussed with the patient. The common side effects of these medications were also explained to the patient. Informed verbal consent was obtained. Goals of current treatment regimen include improvement in pain, restoration of functioning- with focus on improvement in physical performance, general activity, work or disability,emotional distress, health care utilization and  decreased medication consumption. Will continue to monitor progress towards achieving/maintaining therapeutic goals with special emphasis on  1. Improvement in perceived interfernce  of pain with ADL's. Ability to do home exercises independently. Ability to do household chores indoor and/or outdoor work and social and leisure activities. Improve psychosocial and physical functioning. - he is showing progression towards this treatment goal with the current regimen. He was advised against drinking alcohol with the narcotic pain medicines, advised against driving or handling machinery while adjusting the dose of medicines or if having cognitive  issues related to the current medications. Risk of overdose and death, if medicines not taken as prescribed, were also discussed. If the patient develops new symptoms or if the symptoms worsen, the patient should call the office. While transcribing every attempt was made to maintain the accuracy of the note in terms of it's contents,there may have been some errors made inadvertently. Thank you for allowing me to participate in the care of this patient.     SCOTTY Og    Cc: Ally Castrejon MD

## 2023-03-16 ENCOUNTER — OFFICE VISIT (OUTPATIENT)
Dept: PAIN MANAGEMENT | Age: 63
End: 2023-03-16

## 2023-03-16 VITALS
WEIGHT: 221 LBS | DIASTOLIC BLOOD PRESSURE: 70 MMHG | OXYGEN SATURATION: 97 % | BODY MASS INDEX: 28.76 KG/M2 | SYSTOLIC BLOOD PRESSURE: 138 MMHG | HEART RATE: 49 BPM

## 2023-03-16 DIAGNOSIS — Z79.891 ENCOUNTER FOR LONG-TERM OPIATE ANALGESIC USE: ICD-10-CM

## 2023-03-16 DIAGNOSIS — Z51.81 ENCOUNTER FOR THERAPEUTIC DRUG MONITORING: ICD-10-CM

## 2023-03-16 DIAGNOSIS — M54.16 LUMBAR RADICULOPATHY: ICD-10-CM

## 2023-03-16 DIAGNOSIS — G89.4 CHRONIC PAIN SYNDROME: ICD-10-CM

## 2023-03-16 PROCEDURE — 99213 OFFICE O/P EST LOW 20 MIN: CPT | Performed by: NURSE PRACTITIONER

## 2023-03-16 RX ORDER — PREGABALIN 75 MG/1
75 CAPSULE ORAL 2 TIMES DAILY
Qty: 60 CAPSULE | Refills: 1 | Status: SHIPPED | OUTPATIENT
Start: 2023-03-16 | End: 2023-04-15

## 2023-03-16 RX ORDER — PREGABALIN 75 MG/1
75 CAPSULE ORAL 2 TIMES DAILY
Qty: 60 CAPSULE | Refills: 1 | Status: CANCELLED | OUTPATIENT
Start: 2023-03-16 | End: 2023-04-15

## 2023-03-16 RX ORDER — HYDROCODONE BITARTRATE AND ACETAMINOPHEN 7.5; 325 MG/1; MG/1
1 TABLET ORAL 2 TIMES DAILY
Qty: 56 TABLET | Refills: 0 | Status: SHIPPED | OUTPATIENT
Start: 2023-03-16 | End: 2023-04-13

## 2023-03-16 NOTE — PROGRESS NOTES
Chaitanya Villatoro  1960  [id number]      HISTORY OF PRESENT ILLNESS: Mr. Diamante Leslie is a 65 y.o. male returns for a follow up visit for pain management  He has a diagnosis of   1. Lumbar radiculopathy    2. Chronic pain syndrome    3. Encounter for long-term opiate analgesic use    4. Encounter for therapeutic drug monitoring    . As per Information Obtained from the PADT (Patient Assessment and Documentation Tool)    He complains of pain in the right upper leg radiating down to right ankle. He rates the pain 8/10 and describes it as pins and needles. Current treatment regimen has helped relieve about 60% of the pain. He denies any side effects from the current pain regimen. Patient reports that since the last follow up visit the physical functioning is better, family/social relationships are better, mood is unchanged sleep patterns are better, and that the overall functioning is unchanged. Patient denies misusing/abusing his narcotic pain medications or using any illegal drugs. Upon obtaining medical history from Mr. Zoila Peacock: Patients list of allergies were reviewed     MEDICATIONS: Mr. Diamante Leslie list of medications were reviewed. His current medications are   Outpatient Medications Prior to Visit   Medication Sig Dispense Refill    pregabalin (LYRICA) 75 MG capsule Take 1 capsule by mouth 2 times daily for 30 days. 60 capsule 1    metoprolol succinate (TOPROL XL) 100 MG extended release tablet Take 1 tablet by mouth daily 90 tablet 0    amLODIPine (NORVASC) 5 MG tablet Take 1 tablet by mouth daily 90 tablet 0    fluticasone (FLONASE) 50 MCG/ACT nasal spray 2 sprays by Each Nostril route daily 16 g 5     No facility-administered medications prior to visit. SOCIAL/FAMILY/PAST MEDICAL HISTORY: Mr. Argueta Ian, family and past medical history was reviewed. REVIEW OF SYSTEMS:    Respiratory: Negative for apnea, chest tightness and shortness of breath or change in baseline breathing. Gastrointestinal: Negative for nausea, vomiting, abdominal pain, diarrhea, constipation, blood in stool and abdominal distention. PHYSICAL EXAM:   Nursing note and vitals reviewed. /70   Pulse (!) 49   Wt 221 lb (100.2 kg)   SpO2 97%   BMI 28.76 kg/m²   Constitutional: He appears well-developed and well-nourished. No acute distress. Skin: Skin is warm and dry, good turgor. No rash noted. He is not diaphoretic. Cardiovascular: Normal rate, regular rhythm, normal heart sounds, and does not have murmur. Pulmonary/Chest: Effort normal. No respiratory distress. He does not have wheezes in the lung fields. He has no rales. Neurological/Psychiatric:He is alert and oriented to person, place, and time. Coordination is  normal.  His mood isAppropriate and affect is Neutral/Euthymic(normal) . Prescription pain medication monitoring:                  MEDD current = 15              ORT Score = 0              Other Risk factors - (mood) stable              Date of Last Medication Agreement: 08/09/2022              Date Naloxone prescribed: 09/09/2022              UDT:                          Date of last UDT: 12/20/2022                          Adverse report: No              OARRS:                          Checked today: Yes                          Adverse report: No    IMPRESSION:   1. Lumbar radiculopathy    2. Chronic pain syndrome    3. Encounter for long-term opiate analgesic use    4.  Encounter for therapeutic drug monitoring        PLAN:  Informed verbal consent was obtained:  -continue and refill Norco 7.5mg 1-2 tabs per day PRN   -refill lyrica, he takes BID w/o SE  -f/u 28 days for reassessment     Analgesic Plan:              Continue present regimen: yes              Adjust dose of present analgesic:no              Switch analgesics:no              Add/Adjust concomitant therapy:no      Current Outpatient Medications   Medication Sig Dispense Refill    pregabalin (LYRICA) 75 MG capsule Take 1 capsule by mouth 2 times daily for 30 days. 60 capsule 1    metoprolol succinate (TOPROL XL) 100 MG extended release tablet Take 1 tablet by mouth daily 90 tablet 0    amLODIPine (NORVASC) 5 MG tablet Take 1 tablet by mouth daily 90 tablet 0    fluticasone (FLONASE) 50 MCG/ACT nasal spray 2 sprays by Each Nostril route daily 16 g 5     No current facility-administered medications for this visit. I will continue his current medication regimen  which is part of the above treatment schedule. It has been helping with Mr. Demarco Billingsley chronic  medical problems which for this visit include:   Diagnoses of Lumbar radiculopathy, Chronic pain syndrome, Encounter for long-term opiate analgesic use, and Encounter for therapeutic drug monitoring were pertinent to this visit. Risks and benefits of the medications and other alternative treatments  including no treatment were discussed with the patient. The common side effects of these medications were also explained to the patient. Informed verbal consent was obtained. Goals of current treatment regimen include improvement in pain, restoration of functioning- with focus on improvement in physical performance, general activity, work or disability,emotional distress, health care utilization and  decreased medication consumption. Will continue to monitor progress towards achieving/maintaining therapeutic goals with special emphasis on  1. Improvement in perceived interfernce  of pain with ADL's. Ability to do home exercises independently. Ability to do household chores indoor and/or outdoor work and social and leisure activities. Improve psychosocial and physical functioning. - he is maintaining his treatment goal with the current regimen.      He was advised against drinking alcohol with the narcotic pain medicines, advised against driving or handling machinery while adjusting the dose of medicines or if having cognitive  issues related to the current medications. Risk of overdose and death, if medicines not taken as prescribed, were also discussed. If the patient develops new symptoms or if the symptoms worsen, the patient should call the office. While transcribing every attempt was made to maintain the accuracy of the note in terms of it's contents,there may have been some errors made inadvertently. Thank you for allowing me to participate in the care of this patient.     Felix Velasquez NP-MYLA    Cc: Heidi Birmingham MD

## 2023-05-05 ENCOUNTER — OFFICE VISIT (OUTPATIENT)
Dept: PAIN MANAGEMENT | Age: 63
End: 2023-05-05

## 2023-05-05 VITALS
WEIGHT: 214.2 LBS | SYSTOLIC BLOOD PRESSURE: 134 MMHG | HEART RATE: 63 BPM | DIASTOLIC BLOOD PRESSURE: 76 MMHG | OXYGEN SATURATION: 99 % | BODY MASS INDEX: 27.88 KG/M2

## 2023-05-05 DIAGNOSIS — M54.16 LUMBAR RADICULOPATHY: ICD-10-CM

## 2023-05-05 DIAGNOSIS — G89.4 CHRONIC PAIN SYNDROME: ICD-10-CM

## 2023-05-05 DIAGNOSIS — Z79.891 ENCOUNTER FOR LONG-TERM OPIATE ANALGESIC USE: ICD-10-CM

## 2023-05-05 DIAGNOSIS — Z51.81 ENCOUNTER FOR THERAPEUTIC DRUG MONITORING: ICD-10-CM

## 2023-05-05 PROCEDURE — 99213 OFFICE O/P EST LOW 20 MIN: CPT | Performed by: NURSE PRACTITIONER

## 2023-05-05 RX ORDER — PREGABALIN 75 MG/1
75 CAPSULE ORAL 2 TIMES DAILY
Qty: 60 CAPSULE | Refills: 1 | Status: SHIPPED | OUTPATIENT
Start: 2023-05-05 | End: 2023-06-04

## 2023-05-05 RX ORDER — HYDROCODONE BITARTRATE AND ACETAMINOPHEN 7.5; 325 MG/1; MG/1
1 TABLET ORAL 2 TIMES DAILY
Qty: 56 TABLET | Refills: 0 | Status: SHIPPED | OUTPATIENT
Start: 2023-05-05 | End: 2023-06-02

## 2023-05-05 NOTE — PROGRESS NOTES
Jessica Jiménez  1960  [id number]      HISTORY OF PRESENT ILLNESS: Mr. Julia Mary is a 65 y.o. male returns for a follow up visit for pain management  He has a diagnosis of   1. Lumbar radiculopathy    2. Chronic pain syndrome    3. Encounter for long-term opiate analgesic use    4. Encounter for therapeutic drug monitoring    . As per Information Obtained from the PADT (Patient Assessment and Documentation Tool)    He complains of pain in the lower back radiating to right ankle. He rates the pain 5/10 and describes it as sharp, burning, numbness, pins and needles. Current treatment regimen has helped relieve about 50% of the pain. He denies any side effects from the current pain regimen. Patient reports that since the last follow up visit the physical functioning is unchanged, family/social relationships are unchanged, mood is better sleep patterns are better, and that the overall functioning is better. Patient denies misusing/abusing his narcotic pain medications or using any illegal drugs. Upon obtaining medical history from Mr. Ivan Mott: Patients list of allergies were reviewed     MEDICATIONS: Mr. Julia Mary list of medications were reviewed. His current medications are   Outpatient Medications Prior to Visit   Medication Sig Dispense Refill    vitamin D (ERGOCALCIFEROL) 1.25 MG (55102 UT) CAPS capsule Take 1 capsule by mouth once a week 4 capsule 5    DULoxetine (CYMBALTA) 60 MG extended release capsule Take 1 capsule by mouth daily 30 capsule 5    metoprolol succinate (TOPROL XL) 100 MG extended release tablet Take 1 tablet by mouth daily 90 tablet 0    amLODIPine (NORVASC) 5 MG tablet TAKE ONE TABLET BY MOUTH DAILY 90 tablet 0    pregabalin (LYRICA) 75 MG capsule Take 1 capsule by mouth 2 times daily for 30 days.  Max Daily Amount: 150 mg 60 capsule 1    fluticasone (FLONASE) 50 MCG/ACT nasal spray 2 sprays by Each Nostril route daily 16 g 5     No facility-administered medications prior

## 2023-07-28 ENCOUNTER — TELEPHONE (OUTPATIENT)
Dept: PAIN MANAGEMENT | Age: 63
End: 2023-07-28

## 2023-07-28 ENCOUNTER — OFFICE VISIT (OUTPATIENT)
Dept: PAIN MANAGEMENT | Age: 63
End: 2023-07-28

## 2023-07-28 VITALS
BODY MASS INDEX: 28.16 KG/M2 | WEIGHT: 216.4 LBS | SYSTOLIC BLOOD PRESSURE: 142 MMHG | HEART RATE: 45 BPM | OXYGEN SATURATION: 100 % | DIASTOLIC BLOOD PRESSURE: 70 MMHG

## 2023-07-28 DIAGNOSIS — Z51.81 ENCOUNTER FOR THERAPEUTIC DRUG MONITORING: ICD-10-CM

## 2023-07-28 DIAGNOSIS — Z79.891 ENCOUNTER FOR LONG-TERM OPIATE ANALGESIC USE: ICD-10-CM

## 2023-07-28 DIAGNOSIS — G89.4 CHRONIC PAIN SYNDROME: Primary | ICD-10-CM

## 2023-07-28 DIAGNOSIS — M54.16 LUMBAR RADICULOPATHY: ICD-10-CM

## 2023-07-28 PROCEDURE — 99213 OFFICE O/P EST LOW 20 MIN: CPT | Performed by: NURSE PRACTITIONER

## 2023-07-28 RX ORDER — PREGABALIN 75 MG/1
75 CAPSULE ORAL 2 TIMES DAILY
Qty: 60 CAPSULE | Refills: 1 | Status: SHIPPED | OUTPATIENT
Start: 2023-07-28 | End: 2023-09-26

## 2023-07-28 RX ORDER — HYDROCODONE BITARTRATE AND ACETAMINOPHEN 7.5; 325 MG/1; MG/1
1 TABLET ORAL 2 TIMES DAILY
Qty: 56 TABLET | Refills: 0 | Status: SHIPPED | OUTPATIENT
Start: 2023-07-28 | End: 2023-08-25

## 2023-07-28 NOTE — TELEPHONE ENCOUNTER
Submitted PA for Norco Via [facility]: [id number] STATUS: PENDING. Follow up done daily; if no response in three days we will refax for status check. If another three days goes by with no response we will call the insurance for status.

## 2023-07-28 NOTE — PROGRESS NOTES
analgesic use        PLAN:  Informed verbal consent was obtained:  -continue and refill Norco 7.5mg 1-2 tabs per day PRN   -continues to be asymptomatic of the bradycardia. No new acute concerns  -refill lyrica, he takes BID w/o SE  -f/u 6 weeks for reassessment      Analgesic Plan:              Continue present regimen:yes              Adjust dose of present analgesic:no              Switch analgesics:no              Add/Adjust concomitant therapy: no  Current Outpatient Medications   Medication Sig Dispense Refill    pregabalin (LYRICA) 75 MG capsule Take 1 capsule by mouth 2 times daily for 60 days. Max Daily Amount: 150 mg 60 capsule 1    HYDROcodone-acetaminophen (NORCO) 7.5-325 MG per tablet Take 1 tablet by mouth 2 times daily for 28 days. Max Daily Amount: 2 tablets 56 tablet 0    amLODIPine (NORVASC) 5 MG tablet TAKE ONE TABLET BY MOUTH DAILY 90 tablet 0    cetirizine (ZYRTEC ALLERGY) 10 MG tablet Take 1 tablet by mouth daily 10 tablet 0    vitamin D (ERGOCALCIFEROL) 1.25 MG (11149 UT) CAPS capsule Take 1 capsule by mouth once a week 4 capsule 5    DULoxetine (CYMBALTA) 60 MG extended release capsule Take 1 capsule by mouth daily 30 capsule 5    metoprolol succinate (TOPROL XL) 100 MG extended release tablet Take 1 tablet by mouth daily 90 tablet 0    fluticasone (FLONASE) 50 MCG/ACT nasal spray 2 sprays by Each Nostril route daily 16 g 0    fluticasone (FLONASE) 50 MCG/ACT nasal spray 2 sprays by Each Nostril route daily 16 g 5    azithromycin (ZITHROMAX) 250 MG tablet Take 1 tablet by mouth See Admin Instructions for 5 days 500mg on day 1 followed by 250mg on days 2 - 5 6 tablet 0    cetirizine (ZYRTEC ALLERGY) 10 MG tablet Take 1 tablet by mouth daily 10 tablet 0     No current facility-administered medications for this visit. I will continue his current medication regimen  which is part of the above treatment schedule.  It has been helping with Mr. Dorinda Rico chronic  medical problems which for this

## 2023-09-08 ENCOUNTER — OFFICE VISIT (OUTPATIENT)
Dept: PAIN MANAGEMENT | Age: 63
End: 2023-09-08

## 2023-09-08 ENCOUNTER — TELEPHONE (OUTPATIENT)
Dept: PAIN MANAGEMENT | Age: 63
End: 2023-09-08

## 2023-09-08 VITALS
OXYGEN SATURATION: 99 % | HEART RATE: 50 BPM | DIASTOLIC BLOOD PRESSURE: 69 MMHG | BODY MASS INDEX: 28.11 KG/M2 | WEIGHT: 216 LBS | SYSTOLIC BLOOD PRESSURE: 133 MMHG

## 2023-09-08 DIAGNOSIS — Z51.81 ENCOUNTER FOR THERAPEUTIC DRUG MONITORING: ICD-10-CM

## 2023-09-08 DIAGNOSIS — G89.4 CHRONIC PAIN SYNDROME: Primary | ICD-10-CM

## 2023-09-08 DIAGNOSIS — Z79.891 ENCOUNTER FOR LONG-TERM OPIATE ANALGESIC USE: ICD-10-CM

## 2023-09-08 DIAGNOSIS — M54.16 LUMBAR RADICULOPATHY: ICD-10-CM

## 2023-09-08 PROCEDURE — 99213 OFFICE O/P EST LOW 20 MIN: CPT | Performed by: NURSE PRACTITIONER

## 2023-09-08 RX ORDER — HYDROCODONE BITARTRATE AND ACETAMINOPHEN 7.5; 325 MG/1; MG/1
1 TABLET ORAL 2 TIMES DAILY
Qty: 56 TABLET | Refills: 0 | Status: SHIPPED | OUTPATIENT
Start: 2023-09-08 | End: 2023-10-06

## 2023-09-08 RX ORDER — PREGABALIN 75 MG/1
75 CAPSULE ORAL 2 TIMES DAILY
Qty: 60 CAPSULE | Refills: 1 | Status: SHIPPED | OUTPATIENT
Start: 2023-09-08 | End: 2023-11-07

## 2023-09-08 NOTE — PROGRESS NOTES
radiculopathy, Encounter for therapeutic drug monitoring, and Encounter for long-term opiate analgesic use were also pertinent to this visit. Risks and benefits of the medications and other alternative treatments  including no treatment were discussed with the patient. The common side effects of these medications were also explained to the patient. Informed verbal consent was obtained. Goals of current treatment regimen include improvement in pain, restoration of functioning- with focus on improvement in physical performance, general activity, work or disability,emotional distress, health care utilization and  decreased medication consumption. Will continue to monitor progress towards achieving/maintaining therapeutic goals with special emphasis on  1. Improvement in perceived interfernce  of pain with ADL's. Ability to do home exercises independently. Ability to do household chores indoor and/or outdoor work and social and leisure activities. Improve psychosocial and physical functioning. - he is maintaining his treatment goal with the current regimen. He was advised against drinking alcohol with the narcotic pain medicines, advised against driving or handling machinery while adjusting the dose of medicines or if having cognitive  issues related to the current medications. Risk of overdose and death, if medicines not taken as prescribed, were also discussed. If the patient develops new symptoms or if the symptoms worsen, the patient should call the office. While transcribing every attempt was made to maintain the accuracy of the note in terms of it's contents,there may have been some errors made inadvertently. Thank you for allowing me to participate in the care of this patient.     SCOTTY Valencia    Cc: Kaykay Morfin MD

## 2023-09-08 NOTE — TELEPHONE ENCOUNTER
Submitted PA for [address]: [id number] STATUS: PENDING. Follow up done daily; if no response in three days we will refax for status check. If another three days goes by with no response we will call the insurance for status.

## 2023-09-11 NOTE — TELEPHONE ENCOUNTER
The medication was DENIED; DENIAL letter uploaded to [employer]. If you want an APPEAL; please note in this encounter what new information you would like to APPEAL with. Once complete route back to PA POOL. If this requires a response please respond to the pool ( [phone]). Thank you please advise patient.

## 2023-10-20 ENCOUNTER — OFFICE VISIT (OUTPATIENT)
Dept: PAIN MANAGEMENT | Age: 63
End: 2023-10-20

## 2023-10-20 VITALS
DIASTOLIC BLOOD PRESSURE: 72 MMHG | SYSTOLIC BLOOD PRESSURE: 137 MMHG | BODY MASS INDEX: 27.98 KG/M2 | OXYGEN SATURATION: 99 % | WEIGHT: 215 LBS | HEART RATE: 55 BPM

## 2023-10-20 DIAGNOSIS — G89.4 CHRONIC PAIN SYNDROME: ICD-10-CM

## 2023-10-20 DIAGNOSIS — M54.16 LUMBAR RADICULOPATHY: ICD-10-CM

## 2023-10-20 DIAGNOSIS — Z51.81 ENCOUNTER FOR THERAPEUTIC DRUG MONITORING: ICD-10-CM

## 2023-10-20 DIAGNOSIS — Z79.891 ENCOUNTER FOR LONG-TERM OPIATE ANALGESIC USE: ICD-10-CM

## 2023-10-20 PROCEDURE — 99213 OFFICE O/P EST LOW 20 MIN: CPT | Performed by: NURSE PRACTITIONER

## 2023-10-20 NOTE — PROGRESS NOTES
[place] Rick  1960  [id number]      HISTORY OF PRESENT ILLNESS: Mr. Elliot Arriaga is a 59 y.o. male returns for a follow up visit for pain management  He has a diagnosis of   1. Lumbar radiculopathy    2. Encounter for therapeutic drug monitoring    3. Chronic pain syndrome    4. Encounter for long-term opiate analgesic use    . New Medications since Last Office visit have been reviewed with patient. As per Information Obtained from the PADT (Patient Assessment and Documentation Tool)    He complains of pain in the lower back. He rates the pain 7/10 and describes it as sharp. Current treatment regimen has helped relieve about 70% of the pain since beginning treatment plan. He denies any side effects from the current pain regimen. Patient reports that since implementation of their treatment plan; their physical functioning is better, family/social relationships are better, mood is better sleep patterns are better, and that the overall functioning is better. Patient denies/admits that any of the above have changed since last office visit. Patient denies misusing/abusing his narcotic pain medications or using any illegal drugs. Upon obtaining medical history from Mr. Schmid Ar: Patients list of allergies were reviewed     MEDICATIONS: Mr. Elliot Arriaga list of medications were reviewed. His current medications are   Outpatient Medications Prior to Visit   Medication Sig Dispense Refill    pregabalin (LYRICA) 75 MG capsule Take 1 capsule by mouth 2 times daily for 60 days.  Max Daily Amount: 150 mg 60 capsule 1    vitamin D (ERGOCALCIFEROL) 1.25 MG (48654 UT) CAPS capsule Take 1 capsule by mouth once a week 4 capsule 5    amLODIPine (NORVASC) 5 MG tablet Take 1 tablet by mouth daily 90 tablet 0    cetirizine (ZYRTEC ALLERGY) 10 MG tablet Take 1 tablet by mouth daily 10 tablet 0    cetirizine (ZYRTEC ALLERGY) 10 MG tablet Take 1 tablet by mouth daily 10 tablet 0    DULoxetine (CYMBALTA) 60 MG extended

## 2023-11-30 ENCOUNTER — OFFICE VISIT (OUTPATIENT)
Dept: PAIN MANAGEMENT | Age: 63
End: 2023-11-30

## 2023-11-30 VITALS
SYSTOLIC BLOOD PRESSURE: 151 MMHG | OXYGEN SATURATION: 100 % | BODY MASS INDEX: 27.72 KG/M2 | HEART RATE: 47 BPM | WEIGHT: 213 LBS | DIASTOLIC BLOOD PRESSURE: 67 MMHG

## 2023-11-30 DIAGNOSIS — G89.4 CHRONIC PAIN SYNDROME: ICD-10-CM

## 2023-11-30 DIAGNOSIS — Z51.81 ENCOUNTER FOR THERAPEUTIC DRUG MONITORING: ICD-10-CM

## 2023-11-30 DIAGNOSIS — Z79.891 ENCOUNTER FOR LONG-TERM OPIATE ANALGESIC USE: ICD-10-CM

## 2023-11-30 DIAGNOSIS — M54.16 LUMBAR RADICULOPATHY: ICD-10-CM

## 2023-11-30 PROCEDURE — 99213 OFFICE O/P EST LOW 20 MIN: CPT | Performed by: NURSE PRACTITIONER

## 2023-11-30 RX ORDER — HYDROCODONE BITARTRATE AND ACETAMINOPHEN 7.5; 325 MG/1; MG/1
1 TABLET ORAL 2 TIMES DAILY
Qty: 56 TABLET | Refills: 0 | Status: SHIPPED | OUTPATIENT
Start: 2023-11-30 | End: 2023-12-28

## 2023-11-30 RX ORDER — PREGABALIN 75 MG/1
75 CAPSULE ORAL 2 TIMES DAILY
Qty: 60 CAPSULE | Refills: 1 | Status: SHIPPED | OUTPATIENT
Start: 2023-11-30 | End: 2024-01-29

## 2023-11-30 NOTE — PROGRESS NOTES
Diego Bassett  1960  [id number]      HISTORY OF PRESENT ILLNESS: Mr. Delray Kawasaki is a 59 y.o. male returns for a follow up visit for pain management  He has a diagnosis of   1. Lumbar radiculopathy    2. Encounter for therapeutic drug monitoring    3. Chronic pain syndrome    4. Encounter for long-term opiate analgesic use    . New Medications since Last Office visit have been reviewed with patient. As per Information Obtained from the PADT (Patient Assessment and Documentation Tool)    He complains of pain in the lower back, right leg. He rates the pain 8/10 and describes it as sharp, pins and needles. Current treatment regimen has helped relieve about 70% of the pain since beginning treatment plan. He denies any side effects from the current pain regimen. Patient reports that since implementation of their treatment plan; their physical functioning is better, family/social relationships are better, mood is better sleep patterns are better, and that the overall functioning is better. Patient denies/admits that any of the above have changed since last office visit. Patient denies misusing/abusing his narcotic pain medications or using any illegal drugs. Upon obtaining medical history from Mr. Marcelino Carey: Patients list of allergies were reviewed     MEDICATIONS: Mr. Delray Kawasaki list of medications were reviewed. His current medications are   Outpatient Medications Prior to Visit   Medication Sig Dispense Refill    metoprolol succinate (TOPROL XL) 100 MG extended release tablet Take 1 tablet by mouth daily 90 tablet 0    amLODIPine (NORVASC) 5 MG tablet Take 1 tablet by mouth daily 90 tablet 0    vitamin D (ERGOCALCIFEROL) 1.25 MG (18974 UT) CAPS capsule Take 1 capsule by mouth once a week 4 capsule 5    pregabalin (LYRICA) 75 MG capsule Take 1 capsule by mouth 2 times daily for 60 days. Max Daily Amount: 150 mg 60 capsule 1     No facility-administered medications prior to visit.

## 2024-01-12 ENCOUNTER — OFFICE VISIT (OUTPATIENT)
Dept: PAIN MANAGEMENT | Age: 64
End: 2024-01-12

## 2024-01-12 VITALS
OXYGEN SATURATION: 98 % | SYSTOLIC BLOOD PRESSURE: 161 MMHG | DIASTOLIC BLOOD PRESSURE: 71 MMHG | WEIGHT: 217 LBS | BODY MASS INDEX: 28.24 KG/M2 | HEART RATE: 50 BPM

## 2024-01-12 DIAGNOSIS — Z51.81 ENCOUNTER FOR THERAPEUTIC DRUG MONITORING: ICD-10-CM

## 2024-01-12 DIAGNOSIS — M54.16 LUMBAR RADICULOPATHY: ICD-10-CM

## 2024-01-12 DIAGNOSIS — G89.4 CHRONIC PAIN SYNDROME: ICD-10-CM

## 2024-01-12 DIAGNOSIS — Z79.891 ENCOUNTER FOR LONG-TERM OPIATE ANALGESIC USE: ICD-10-CM

## 2024-01-12 PROCEDURE — 99214 OFFICE O/P EST MOD 30 MIN: CPT | Performed by: NURSE PRACTITIONER

## 2024-01-12 RX ORDER — PREGABALIN 75 MG/1
75 CAPSULE ORAL 2 TIMES DAILY
Qty: 60 CAPSULE | Refills: 1 | Status: SHIPPED | OUTPATIENT
Start: 2024-01-12 | End: 2024-03-12

## 2024-01-12 RX ORDER — HYDROCODONE BITARTRATE AND ACETAMINOPHEN 7.5; 325 MG/1; MG/1
1 TABLET ORAL 2 TIMES DAILY
Qty: 56 TABLET | Refills: 0 | Status: SHIPPED | OUTPATIENT
Start: 2024-01-12 | End: 2024-02-09

## 2024-01-12 NOTE — PROGRESS NOTES
Arnel Hernandez  1960  [id number]      HISTORY OF PRESENT ILLNESS: Mr. Hernandez is a 63 y.o. male returns for a follow up visit for pain management  He has a diagnosis of   1. Lumbar radiculopathy    2. Encounter for therapeutic drug monitoring    3. Chronic pain syndrome    4. Encounter for long-term opiate analgesic use    .      New Medications since Last Office visit have been reviewed with patient.     As per Information Obtained from the PADT (Patient Assessment and Documentation Tool)    He complains of pain in the lower back, buttocks, right leg, right foot. He rates the pain 7/10 and describes it as sharp, numbness, pins and needles. Current treatment regimen has helped relieve about 70% of the pain since beginning treatment plan.  He denies any side effects from the current pain regimen. Patient reports that since implementation of their treatment plan; their physical functioning is better, family/social relationships are better, mood is better sleep patterns are better, and that the overall functioning is better.  Patient denies/admits that any of the above have changed since last office visit. Patient denies misusing/abusing his narcotic pain medications or using any illegal drugs.      Upon obtaining medical history from Mr. Hernandez    ALLERGIES: Patients list of allergies were reviewed     MEDICATIONS: Mr. Hernandez list of medications were reviewed.His current medications are   Outpatient Medications Prior to Visit   Medication Sig Dispense Refill    pregabalin (LYRICA) 75 MG capsule Take 1 capsule by mouth 2 times daily for 60 days. Max Daily Amount: 150 mg 60 capsule 1    metoprolol succinate (TOPROL XL) 100 MG extended release tablet Take 1 tablet by mouth daily 90 tablet 0    amLODIPine (NORVASC) 5 MG tablet Take 1 tablet by mouth daily 90 tablet 0    vitamin D (ERGOCALCIFEROL) 1.25 MG (14889 UT) CAPS capsule Take 1 capsule by mouth once a week 4 capsule 5     No facility-administered medications

## 2024-02-23 ENCOUNTER — OFFICE VISIT (OUTPATIENT)
Dept: PAIN MANAGEMENT | Age: 64
End: 2024-02-23

## 2024-02-23 VITALS
WEIGHT: 216 LBS | BODY MASS INDEX: 28.11 KG/M2 | HEART RATE: 47 BPM | SYSTOLIC BLOOD PRESSURE: 136 MMHG | OXYGEN SATURATION: 99 % | DIASTOLIC BLOOD PRESSURE: 74 MMHG

## 2024-02-23 DIAGNOSIS — Z79.891 ENCOUNTER FOR LONG-TERM OPIATE ANALGESIC USE: ICD-10-CM

## 2024-02-23 DIAGNOSIS — Z51.81 ENCOUNTER FOR THERAPEUTIC DRUG MONITORING: ICD-10-CM

## 2024-02-23 DIAGNOSIS — G89.4 CHRONIC PAIN SYNDROME: Primary | ICD-10-CM

## 2024-02-23 DIAGNOSIS — M54.16 LUMBAR RADICULOPATHY: ICD-10-CM

## 2024-02-23 PROCEDURE — 99213 OFFICE O/P EST LOW 20 MIN: CPT | Performed by: NURSE PRACTITIONER

## 2024-02-23 RX ORDER — HYDROCODONE BITARTRATE AND ACETAMINOPHEN 7.5; 325 MG/1; MG/1
1 TABLET ORAL 2 TIMES DAILY
Qty: 56 TABLET | Refills: 0 | Status: SHIPPED | OUTPATIENT
Start: 2024-02-23 | End: 2024-03-22

## 2024-02-23 RX ORDER — PREGABALIN 75 MG/1
75 CAPSULE ORAL 2 TIMES DAILY
Qty: 60 CAPSULE | Refills: 1 | Status: SHIPPED | OUTPATIENT
Start: 2024-02-23 | End: 2024-04-23

## 2024-02-23 NOTE — PROGRESS NOTES
Arnel Hernandez  1960  [id number]      HISTORY OF PRESENT ILLNESS: Mr. Hernandez is a 63 y.o. male returns for a follow up visit for pain management  He has a diagnosis of   1. Chronic pain syndrome    2. Lumbar radiculopathy    3. Encounter for therapeutic drug monitoring    4. Encounter for long-term opiate analgesic use    .      New Medications since Last Office visit have been reviewed with patient.     As per Information Obtained from the PADT (Patient Assessment and Documentation Tool)    He complains of pain in the lower back, right leg. He rates the pain 6/10 and describes it as sharp, pins and needles. Current treatment regimen has helped relieve about 60% of the pain since beginning treatment plan.  He denies any side effects from the current pain regimen. Patient reports that since implementation of their treatment plan; their physical functioning is better, family/social relationships are better, mood is better sleep patterns are better, and that the overall functioning is better.  Patient denies/admits that any of the above have changed since last office visit. Patient denies misusing/abusing his narcotic pain medications or using any illegal drugs.      Upon obtaining medical history from Mr. Hernandez    ALLERGIES: Patients list of allergies were reviewed     MEDICATIONS: Mr. Hernandez list of medications were reviewed.His current medications are   Outpatient Medications Prior to Visit   Medication Sig Dispense Refill    hydrOXYzine HCl (ATARAX) 25 MG tablet Take 1 tablet by mouth every 8 hours as needed for Itching 30 tablet 0    ibuprofen (ADVIL;MOTRIN) 800 MG tablet Take 1 tablet by mouth every 8 hours as needed for Pain 21 tablet 0    metoprolol succinate (TOPROL XL) 100 MG extended release tablet Take 1 tablet by mouth daily 90 tablet 0    amLODIPine (NORVASC) 5 MG tablet Take 1 tablet by mouth daily 90 tablet 0    vitamin D (ERGOCALCIFEROL) 1.25 MG (75460 UT) CAPS capsule Take 1 capsule by mouth

## 2024-04-05 ENCOUNTER — OFFICE VISIT (OUTPATIENT)
Dept: PAIN MANAGEMENT | Age: 64
End: 2024-04-05

## 2024-04-05 VITALS
SYSTOLIC BLOOD PRESSURE: 144 MMHG | WEIGHT: 219 LBS | HEART RATE: 56 BPM | OXYGEN SATURATION: 98 % | BODY MASS INDEX: 28.5 KG/M2 | DIASTOLIC BLOOD PRESSURE: 63 MMHG

## 2024-04-05 DIAGNOSIS — Z79.891 ENCOUNTER FOR LONG-TERM OPIATE ANALGESIC USE: ICD-10-CM

## 2024-04-05 DIAGNOSIS — M54.16 LUMBAR RADICULOPATHY: Primary | ICD-10-CM

## 2024-04-05 DIAGNOSIS — Z51.81 ENCOUNTER FOR THERAPEUTIC DRUG MONITORING: ICD-10-CM

## 2024-04-05 DIAGNOSIS — G89.4 CHRONIC PAIN SYNDROME: ICD-10-CM

## 2024-04-05 PROCEDURE — 99213 OFFICE O/P EST LOW 20 MIN: CPT | Performed by: NURSE PRACTITIONER

## 2024-04-05 RX ORDER — HYDROCODONE BITARTRATE AND ACETAMINOPHEN 7.5; 325 MG/1; MG/1
1 TABLET ORAL 2 TIMES DAILY
Qty: 56 TABLET | Refills: 0 | Status: SHIPPED | OUTPATIENT
Start: 2024-04-05 | End: 2024-05-03

## 2024-04-05 RX ORDER — PREGABALIN 75 MG/1
75 CAPSULE ORAL 2 TIMES DAILY
Qty: 60 CAPSULE | Refills: 1 | Status: SHIPPED | OUTPATIENT
Start: 2024-04-05 | End: 2024-06-04

## 2024-04-05 NOTE — PROGRESS NOTES
Arnel Hernandez  1960  [id number]      HISTORY OF PRESENT ILLNESS: Mr. Hernandez is a 63 y.o. male returns for a follow up visit for pain management  He has a diagnosis of   1. Lumbar radiculopathy    2. Encounter for therapeutic drug monitoring    3. Chronic pain syndrome    4. Encounter for long-term opiate analgesic use    .      New Medications since Last Office visit have been reviewed with patient.     As per Information Obtained from the PADT (Patient Assessment and Documentation Tool)    He complains of pain in the lower back, right leg. He rates the pain 7/10 and describes it as aching. Current treatment regimen has helped relieve about 70% of the pain since beginning treatment plan.  He denies any side effects from the current pain regimen. Patient reports that since implementation of their treatment plan; their physical functioning is better, family/social relationships are better, mood is better sleep patterns are better, and that the overall functioning is better.  Patient denies/admits that any of the above have changed since last office visit. Patient denies misusing/abusing his narcotic pain medications or using any illegal drugs.      Upon obtaining medical history from Mr. Hernandez    ALLERGIES: Patients list of allergies were reviewed     MEDICATIONS: Mr. Hernandez list of medications were reviewed.His current medications are   Outpatient Medications Prior to Visit   Medication Sig Dispense Refill    pregabalin (LYRICA) 75 MG capsule Take 1 capsule by mouth 2 times daily for 60 days. Max Daily Amount: 150 mg 60 capsule 1    ibuprofen (ADVIL;MOTRIN) 800 MG tablet Take 1 tablet by mouth every 8 hours as needed for Pain 21 tablet 0    metoprolol succinate (TOPROL XL) 100 MG extended release tablet Take 1 tablet by mouth daily 90 tablet 0    amLODIPine (NORVASC) 5 MG tablet Take 1 tablet by mouth daily 90 tablet 0    vitamin D (ERGOCALCIFEROL) 1.25 MG (94536 UT) CAPS capsule Take 1 capsule by mouth

## 2024-05-16 ENCOUNTER — OFFICE VISIT (OUTPATIENT)
Dept: PAIN MANAGEMENT | Age: 64
End: 2024-05-16

## 2024-05-16 VITALS
HEART RATE: 42 BPM | DIASTOLIC BLOOD PRESSURE: 65 MMHG | OXYGEN SATURATION: 100 % | BODY MASS INDEX: 27.59 KG/M2 | WEIGHT: 212 LBS | SYSTOLIC BLOOD PRESSURE: 135 MMHG

## 2024-05-16 DIAGNOSIS — Z51.81 ENCOUNTER FOR THERAPEUTIC DRUG MONITORING: ICD-10-CM

## 2024-05-16 DIAGNOSIS — G89.4 CHRONIC PAIN SYNDROME: ICD-10-CM

## 2024-05-16 DIAGNOSIS — M54.16 LUMBAR RADICULOPATHY: Primary | ICD-10-CM

## 2024-05-16 DIAGNOSIS — Z79.891 ENCOUNTER FOR LONG-TERM OPIATE ANALGESIC USE: ICD-10-CM

## 2024-05-16 PROCEDURE — 99214 OFFICE O/P EST MOD 30 MIN: CPT | Performed by: NURSE PRACTITIONER

## 2024-05-16 RX ORDER — PREGABALIN 75 MG/1
75 CAPSULE ORAL 2 TIMES DAILY
Qty: 60 CAPSULE | Refills: 1 | Status: SHIPPED | OUTPATIENT
Start: 2024-05-16 | End: 2024-07-15

## 2024-05-16 RX ORDER — HYDROCODONE BITARTRATE AND ACETAMINOPHEN 7.5; 325 MG/1; MG/1
1 TABLET ORAL 2 TIMES DAILY
Qty: 56 TABLET | Refills: 0 | Status: SHIPPED | OUTPATIENT
Start: 2024-05-16 | End: 2024-06-13

## 2024-05-16 NOTE — PROGRESS NOTES
Arnel Hernandez  1960  [id number]      HISTORY OF PRESENT ILLNESS: Mr. Hernandez is a 63 y.o. male returns for a follow up visit for pain management  He has a diagnosis of   1. Lumbar radiculopathy    2. Encounter for therapeutic drug monitoring    3. Chronic pain syndrome    4. Encounter for long-term opiate analgesic use    .      New Medications since Last Office visit have been reviewed with patient.     As per Information Obtained from the PADT (Patient Assessment and Documentation Tool)    He complains of pain in the right leg. He rates the pain 6/10 and describes it as sharp, burning. Current treatment regimen has helped relieve about 70% of the pain since beginning treatment plan.  He denies any side effects from the current pain regimen. Patient reports that since implementation of their treatment plan; their physical functioning is better, family/social relationships are better, mood is better sleep patterns are better, and that the overall functioning is better.  Patient denies/admits that any of the above have changed since last office visit. Patient denies misusing/abusing his narcotic pain medications or using any illegal drugs.      Upon obtaining medical history from Mr. Hernandez    ALLERGIES: Patients list of allergies were reviewed     MEDICATIONS: Mr. Hernandez list of medications were reviewed.His current medications are   Outpatient Medications Prior to Visit   Medication Sig Dispense Refill    metoprolol succinate (TOPROL XL) 100 MG extended release tablet Take 1 tablet by mouth daily 90 tablet 0    vitamin D (ERGOCALCIFEROL) 1.25 MG (57560 UT) CAPS capsule Take 1 capsule by mouth once a week 4 capsule 5    ibuprofen (ADVIL;MOTRIN) 800 MG tablet Take 1 tablet by mouth every 8 hours as needed for Pain 21 tablet 0    amLODIPine (NORVASC) 5 MG tablet Take 1 tablet by mouth daily 90 tablet 0    fenofibrate (TRICOR) 145 MG tablet Take 1 tablet by mouth daily 30 tablet 3    pregabalin (LYRICA) 75 MG

## 2024-06-14 ENCOUNTER — OFFICE VISIT (OUTPATIENT)
Dept: PAIN MANAGEMENT | Age: 64
End: 2024-06-14

## 2024-06-14 VITALS
OXYGEN SATURATION: 99 % | DIASTOLIC BLOOD PRESSURE: 68 MMHG | BODY MASS INDEX: 27.33 KG/M2 | WEIGHT: 210 LBS | HEART RATE: 51 BPM | SYSTOLIC BLOOD PRESSURE: 134 MMHG

## 2024-06-14 DIAGNOSIS — G89.4 CHRONIC PAIN SYNDROME: ICD-10-CM

## 2024-06-14 DIAGNOSIS — M54.16 LUMBAR RADICULOPATHY: Primary | ICD-10-CM

## 2024-06-14 DIAGNOSIS — Z79.891 ENCOUNTER FOR LONG-TERM OPIATE ANALGESIC USE: ICD-10-CM

## 2024-06-14 DIAGNOSIS — Z51.81 ENCOUNTER FOR THERAPEUTIC DRUG MONITORING: ICD-10-CM

## 2024-06-14 PROCEDURE — 99213 OFFICE O/P EST LOW 20 MIN: CPT | Performed by: NURSE PRACTITIONER

## 2024-06-14 RX ORDER — PREGABALIN 75 MG/1
75 CAPSULE ORAL 2 TIMES DAILY
Qty: 60 CAPSULE | Refills: 1 | Status: SHIPPED | OUTPATIENT
Start: 2024-06-14 | End: 2024-08-13

## 2024-06-14 RX ORDER — HYDROCODONE BITARTRATE AND ACETAMINOPHEN 7.5; 325 MG/1; MG/1
1 TABLET ORAL 2 TIMES DAILY
Qty: 56 TABLET | Refills: 0 | Status: SHIPPED | OUTPATIENT
Start: 2024-06-14 | End: 2024-07-12

## 2024-06-14 NOTE — PROGRESS NOTES
Encounter for long-term opiate analgesic use        PLAN:  Informed verbal consent was obtained:  -pt has been well. Continues to work full time  -pain unchanged and denies new pain symptoms.   -c/o sinus congestion that is not resolving. D/w patient OTC treatment options. Consider the allergy rx if symptoms don't improve  -no new SE from current regimen. Uses opiate PRN. Lyrica helping daily  -refill Norco 7.5mg tabs #56  -f/u 6 weeks      Analgesic Plan:              Continue present regimen:yes              Adjust dose of present analgesic:no              Switch analgesics:no              Add/Adjust concomitant therapy:no    Current Outpatient Medications   Medication Sig Dispense Refill    DULoxetine (CYMBALTA) 60 MG extended release capsule TAKE 1 CAPSULE BY MOUTH DAILY 30 capsule 5    pregabalin (LYRICA) 75 MG capsule Take 1 capsule by mouth 2 times daily for 60 days. Max Daily Amount: 150 mg 60 capsule 1    metoprolol succinate (TOPROL XL) 100 MG extended release tablet Take 1 tablet by mouth daily 90 tablet 0    vitamin D (ERGOCALCIFEROL) 1.25 MG (09616 UT) CAPS capsule Take 1 capsule by mouth once a week 4 capsule 5    ibuprofen (ADVIL;MOTRIN) 800 MG tablet Take 1 tablet by mouth every 8 hours as needed for Pain 21 tablet 0    amLODIPine (NORVASC) 5 MG tablet Take 1 tablet by mouth daily 90 tablet 0    fenofibrate (TRICOR) 145 MG tablet Take 1 tablet by mouth daily 30 tablet 3     No current facility-administered medications for this visit.     I will continue his current medication regimen  which is part of the above treatment schedule. It has been helping with Mr. Hernandez's chronic  medical problems which for this visit include:   The primary encounter diagnosis was Lumbar radiculopathy. Diagnoses of Encounter for therapeutic drug monitoring, Chronic pain syndrome, and Encounter for long-term opiate analgesic use were also pertinent to this visit.   Risks and benefits of the medications and other

## 2024-07-26 ENCOUNTER — OFFICE VISIT (OUTPATIENT)
Dept: PAIN MANAGEMENT | Age: 64
End: 2024-07-26

## 2024-07-26 VITALS
OXYGEN SATURATION: 100 % | BODY MASS INDEX: 27.85 KG/M2 | HEART RATE: 47 BPM | SYSTOLIC BLOOD PRESSURE: 127 MMHG | DIASTOLIC BLOOD PRESSURE: 60 MMHG | WEIGHT: 214 LBS

## 2024-07-26 DIAGNOSIS — Z79.891 ENCOUNTER FOR LONG-TERM OPIATE ANALGESIC USE: ICD-10-CM

## 2024-07-26 DIAGNOSIS — M54.16 LUMBAR RADICULOPATHY: ICD-10-CM

## 2024-07-26 DIAGNOSIS — G89.4 CHRONIC PAIN SYNDROME: ICD-10-CM

## 2024-07-26 DIAGNOSIS — Z51.81 ENCOUNTER FOR THERAPEUTIC DRUG MONITORING: ICD-10-CM

## 2024-07-26 PROCEDURE — 99213 OFFICE O/P EST LOW 20 MIN: CPT | Performed by: NURSE PRACTITIONER

## 2024-07-26 RX ORDER — PREGABALIN 75 MG/1
75 CAPSULE ORAL 2 TIMES DAILY
Qty: 60 CAPSULE | Refills: 1 | Status: SHIPPED | OUTPATIENT
Start: 2024-07-26 | End: 2024-09-24

## 2024-07-26 RX ORDER — HYDROCODONE BITARTRATE AND ACETAMINOPHEN 7.5; 325 MG/1; MG/1
1 TABLET ORAL 2 TIMES DAILY
Qty: 56 TABLET | Refills: 0 | Status: SHIPPED | OUTPATIENT
Start: 2024-07-26 | End: 2024-08-23

## 2024-07-26 NOTE — PROGRESS NOTES
Arnel Hernandez  1960  [id number]      HISTORY OF PRESENT ILLNESS: Mr. Hernandez is a 63 y.o. male returns for a follow up visit for pain management  He has a diagnosis of   1. Lumbar radiculopathy    2. Encounter for therapeutic drug monitoring    3. Chronic pain syndrome    4. Encounter for long-term opiate analgesic use    .      New Medications since Last Office visit have been reviewed with patient.     As per Information Obtained from the PADT (Patient Assessment and Documentation Tool)    He complains of pain in the lower back, right leg. He rates the pain 7/10 and describes it as aching. Current treatment regimen has helped relieve about 70% of the pain since beginning treatment plan.  He denies any side effects from the current pain regimen. Patient reports that since implementation of their treatment plan; their physical functioning is better, family/social relationships are better, mood is better sleep patterns are better, and that the overall functioning is better.  Patient denies/admits that any of the above have changed since last office visit. Patient denies misusing/abusing his narcotic pain medications or using any illegal drugs.      Upon obtaining medical history from Mr. Hernandez    ALLERGIES: Patients list of allergies were reviewed     MEDICATIONS: Mr. Hernandez list of medications were reviewed.His current medications are   Outpatient Medications Prior to Visit   Medication Sig Dispense Refill    amLODIPine (NORVASC) 5 MG tablet Take 1 tablet by mouth daily 90 tablet 0    DULoxetine (CYMBALTA) 60 MG extended release capsule Take 1 capsule by mouth daily 30 capsule 5    fenofibrate (TRICOR) 145 MG tablet Take 1 tablet by mouth daily 30 tablet 3    ibuprofen (ADVIL;MOTRIN) 800 MG tablet Take 1 tablet by mouth every 8 hours as needed for Pain 21 tablet 0    metoprolol succinate (TOPROL XL) 100 MG extended release tablet Take 1 tablet by mouth daily 90 tablet 0    vitamin D (ERGOCALCIFEROL) 1.25 MG

## 2024-08-23 ENCOUNTER — OFFICE VISIT (OUTPATIENT)
Dept: PAIN MANAGEMENT | Age: 64
End: 2024-08-23

## 2024-08-23 VITALS
BODY MASS INDEX: 27.98 KG/M2 | SYSTOLIC BLOOD PRESSURE: 120 MMHG | OXYGEN SATURATION: 100 % | WEIGHT: 215 LBS | HEART RATE: 48 BPM | DIASTOLIC BLOOD PRESSURE: 62 MMHG

## 2024-08-23 DIAGNOSIS — M54.16 LUMBAR RADICULOPATHY: Primary | ICD-10-CM

## 2024-08-23 DIAGNOSIS — G89.4 CHRONIC PAIN SYNDROME: ICD-10-CM

## 2024-08-23 DIAGNOSIS — Z79.891 ENCOUNTER FOR LONG-TERM OPIATE ANALGESIC USE: ICD-10-CM

## 2024-08-23 DIAGNOSIS — Z51.81 ENCOUNTER FOR THERAPEUTIC DRUG MONITORING: ICD-10-CM

## 2024-08-23 PROCEDURE — 99213 OFFICE O/P EST LOW 20 MIN: CPT | Performed by: NURSE PRACTITIONER

## 2024-08-23 RX ORDER — HYDROCODONE BITARTRATE AND ACETAMINOPHEN 7.5; 325 MG/1; MG/1
1 TABLET ORAL 2 TIMES DAILY
Qty: 56 TABLET | Refills: 0 | Status: SHIPPED | OUTPATIENT
Start: 2024-08-23 | End: 2024-09-20

## 2024-08-23 RX ORDER — PREGABALIN 75 MG/1
75 CAPSULE ORAL 2 TIMES DAILY
Qty: 60 CAPSULE | Refills: 1 | Status: SHIPPED | OUTPATIENT
Start: 2024-08-23 | End: 2024-10-22

## 2024-08-23 NOTE — PROGRESS NOTES
Arnel Hernandez  1960  [id number]      HISTORY OF PRESENT ILLNESS: Mr. Hernandez is a 64 y.o. male returns for a follow up visit for pain management  He has a diagnosis of   1. Lumbar radiculopathy    2. Encounter for therapeutic drug monitoring    3. Chronic pain syndrome    4. Encounter for long-term opiate analgesic use    .      New Medications since Last Office visit have been reviewed with patient.     As per Information Obtained from the PADT (Patient Assessment and Documentation Tool)    He complains of pain in the lower back, right leg. He rates the pain 7/10 and describes it as aching. Current treatment regimen has helped relieve about 60% of the pain since beginning treatment plan.  He denies any side effects from the current pain regimen. Patient reports that since implementation of their treatment plan; their physical functioning is better, family/social relationships are better, mood is better sleep patterns are better, and that the overall functioning is better.  Patient denies/admits that any of the above have changed since last office visit. Patient denies misusing/abusing his narcotic pain medications or using any illegal drugs.      Upon obtaining medical history from Mr. Hernandez    ALLERGIES: Patients list of allergies were reviewed     MEDICATIONS: Mr. Hernandez list of medications were reviewed.His current medications are   Outpatient Medications Prior to Visit   Medication Sig Dispense Refill    amLODIPine (NORVASC) 5 MG tablet Take 1 tablet by mouth daily 90 tablet 0    DULoxetine (CYMBALTA) 60 MG extended release capsule Take 1 capsule by mouth daily 30 capsule 5    fenofibrate (TRICOR) 145 MG tablet Take 1 tablet by mouth daily 30 tablet 3    ibuprofen (ADVIL;MOTRIN) 800 MG tablet Take 1 tablet by mouth every 8 hours as needed for Pain 21 tablet 0    metoprolol succinate (TOPROL XL) 100 MG extended release tablet Take 1 tablet by mouth daily 90 tablet 0    vitamin D (ERGOCALCIFEROL) 1.25 MG  Encounter for long-term opiate analgesic use were also pertinent to this visit.   Risks and benefits of the medications and other alternative treatments  including no treatment were discussed with the patient.The common side effects of these medications were also explained to the patient.  Informed verbal consent was obtained.   Goals of current treatment regimen include improvement in pain, restoration of functioning- with focus on improvement in physical performance, general activity, work or disability,emotional distress, health care utilization and  decreased medication consumption. Will continue to monitor progress towards achieving/maintaining therapeutic goals with special emphasis on  1. Improvement in perceived interfernce  of pain with ADL's. Ability to do home exercises independently. Ability to do household chores indoor and/or outdoor work and social and leisure activities.Improve psychosocial and physical functioning. he is showing progression towards this treatment goal with the current regimen.     He was advised against drinking alcohol with the narcotic pain medicines, advised against driving or handling machinery while adjusting the dose of medicines or if having cognitive  issues related to the current medications.Risk of overdose and death, if medicines not taken as prescribed, were also discussed. If the patient develops new symptoms or if the symptoms worsen, the patient should call the office.    While transcribing every attempt was made to maintain the accuracy of the note in terms of it's contents,there may have been some errors made inadvertently.  Thank you for allowing me to participate in the care of this patient.    SCOTTY Gimenez    Cc: Rosalva Forrest MD

## 2024-10-04 ENCOUNTER — OFFICE VISIT (OUTPATIENT)
Dept: PAIN MANAGEMENT | Age: 64
End: 2024-10-04

## 2024-10-04 VITALS
HEART RATE: 47 BPM | DIASTOLIC BLOOD PRESSURE: 65 MMHG | WEIGHT: 219 LBS | SYSTOLIC BLOOD PRESSURE: 127 MMHG | OXYGEN SATURATION: 100 % | BODY MASS INDEX: 28.5 KG/M2

## 2024-10-04 DIAGNOSIS — Z79.891 ENCOUNTER FOR LONG-TERM OPIATE ANALGESIC USE: ICD-10-CM

## 2024-10-04 DIAGNOSIS — Z51.81 ENCOUNTER FOR THERAPEUTIC DRUG MONITORING: ICD-10-CM

## 2024-10-04 DIAGNOSIS — M54.16 LUMBAR RADICULOPATHY: Primary | ICD-10-CM

## 2024-10-04 DIAGNOSIS — G89.4 CHRONIC PAIN SYNDROME: ICD-10-CM

## 2024-10-04 PROCEDURE — 99213 OFFICE O/P EST LOW 20 MIN: CPT | Performed by: NURSE PRACTITIONER

## 2024-10-04 RX ORDER — HYDROCODONE BITARTRATE AND ACETAMINOPHEN 7.5; 325 MG/1; MG/1
1 TABLET ORAL 2 TIMES DAILY
Qty: 56 TABLET | Refills: 0 | Status: SHIPPED | OUTPATIENT
Start: 2024-10-04 | End: 2024-11-01

## 2024-10-04 RX ORDER — PREGABALIN 75 MG/1
75 CAPSULE ORAL 2 TIMES DAILY
Qty: 60 CAPSULE | Refills: 1 | Status: SHIPPED | OUTPATIENT
Start: 2024-10-04 | End: 2024-12-03

## 2024-10-04 NOTE — PROGRESS NOTES
and Encounter for long-term opiate analgesic use were also pertinent to this visit.   Risks and benefits of the medications and other alternative treatments  including no treatment were discussed with the patient.The common side effects of these medications were also explained to the patient.  Informed verbal consent was obtained.   Goals of current treatment regimen include improvement in pain, restoration of functioning- with focus on improvement in physical performance, general activity, work or disability,emotional distress, health care utilization and  decreased medication consumption. Will continue to monitor progress towards achieving/maintaining therapeutic goals with special emphasis on  1. Improvement in perceived interfernce  of pain with ADL's. Ability to do home exercises independently. Ability to do household chores indoor and/or outdoor work and social and leisure activities.Improve psychosocial and physical functioning. he is showing progression towards this treatment goal with the current regimen.     He was advised against drinking alcohol with the narcotic pain medicines, advised against driving or handling machinery while adjusting the dose of medicines or if having cognitive  issues related to the current medications.Risk of overdose and death, if medicines not taken as prescribed, were also discussed. If the patient develops new symptoms or if the symptoms worsen, the patient should call the office.    While transcribing every attempt was made to maintain the accuracy of the note in terms of it's contents,there may have been some errors made inadvertently.  Thank you for allowing me to participate in the care of this patient.    SCOTTY Gimenez    Cc: Rosalva Forrest MD

## 2024-11-14 ENCOUNTER — OFFICE VISIT (OUTPATIENT)
Dept: PAIN MANAGEMENT | Age: 64
End: 2024-11-14

## 2024-11-14 VITALS
WEIGHT: 221 LBS | DIASTOLIC BLOOD PRESSURE: 72 MMHG | SYSTOLIC BLOOD PRESSURE: 127 MMHG | OXYGEN SATURATION: 99 % | HEART RATE: 51 BPM | BODY MASS INDEX: 28.76 KG/M2

## 2024-11-14 DIAGNOSIS — G89.4 CHRONIC PAIN SYNDROME: ICD-10-CM

## 2024-11-14 DIAGNOSIS — M47.895 OTHER SPONDYLOSIS, THORACOLUMBAR REGION: Primary | ICD-10-CM

## 2024-11-14 DIAGNOSIS — Z79.891 ENCOUNTER FOR LONG-TERM OPIATE ANALGESIC USE: ICD-10-CM

## 2024-11-14 DIAGNOSIS — Z51.81 ENCOUNTER FOR THERAPEUTIC DRUG MONITORING: ICD-10-CM

## 2024-11-14 DIAGNOSIS — M47.817 LUMBOSACRAL SPONDYLOSIS WITHOUT MYELOPATHY: ICD-10-CM

## 2024-11-14 PROCEDURE — 99213 OFFICE O/P EST LOW 20 MIN: CPT | Performed by: NURSE PRACTITIONER

## 2024-11-14 RX ORDER — PREGABALIN 75 MG/1
75 CAPSULE ORAL 2 TIMES DAILY
Qty: 60 CAPSULE | Refills: 1 | Status: SHIPPED | OUTPATIENT
Start: 2024-11-14 | End: 2025-01-13

## 2024-11-14 RX ORDER — HYDROCODONE BITARTRATE AND ACETAMINOPHEN 7.5; 325 MG/1; MG/1
1 TABLET ORAL 2 TIMES DAILY
Qty: 56 TABLET | Refills: 0 | Status: SHIPPED | OUTPATIENT
Start: 2024-11-14 | End: 2024-12-12

## 2024-11-14 NOTE — PROGRESS NOTES
a week 4 capsule 5    methylPREDNISolone (MEDROL, VERONA,) 4 MG tablet Take by mouth. 1 kit 0     No current facility-administered medications for this visit.     I will continue his current medication regimen  which is part of the above treatment schedule. It has been helping with Mr. Hernandez's chronic  medical problems which for this visit include:   The primary encounter diagnosis was Other spondylosis, thoracolumbar region. Diagnoses of Encounter for therapeutic drug monitoring, Chronic pain syndrome, Encounter for long-term opiate analgesic use, and Lumbosacral spondylosis without myelopathy were also pertinent to this visit.   Risks and benefits of the medications and other alternative treatments  including no treatment were discussed with the patient.The common side effects of these medications were also explained to the patient.  Informed verbal consent was obtained.   Goals of current treatment regimen include improvement in pain, restoration of functioning- with focus on improvement in physical performance, general activity, work or disability,emotional distress, health care utilization and  decreased medication consumption. Will continue to monitor progress towards achieving/maintaining therapeutic goals with special emphasis on  1. Improvement in perceived interfernce  of pain with ADL's. Ability to do home exercises independently. Ability to do household chores indoor and/or outdoor work and social and leisure activities.Improve psychosocial and physical functioning.  he is showing progression towards this treatment goal with the current regimen.     He was advised against drinking alcohol with the narcotic pain medicines, advised against driving or handling machinery while adjusting the dose of medicines or if having cognitive  issues related to the current medications.Risk of overdose and death, if medicines not taken as prescribed, were also discussed. If the patient develops new symptoms or if the

## 2024-12-19 ENCOUNTER — OFFICE VISIT (OUTPATIENT)
Dept: PAIN MANAGEMENT | Age: 64
End: 2024-12-19
Payer: COMMERCIAL

## 2024-12-19 VITALS
DIASTOLIC BLOOD PRESSURE: 67 MMHG | BODY MASS INDEX: 28.5 KG/M2 | WEIGHT: 219 LBS | OXYGEN SATURATION: 98 % | HEART RATE: 52 BPM | SYSTOLIC BLOOD PRESSURE: 137 MMHG

## 2024-12-19 DIAGNOSIS — M54.16 LUMBAR RADICULOPATHY: ICD-10-CM

## 2024-12-19 DIAGNOSIS — Z51.81 ENCOUNTER FOR THERAPEUTIC DRUG MONITORING: ICD-10-CM

## 2024-12-19 DIAGNOSIS — M79.10 MYALGIA: Primary | ICD-10-CM

## 2024-12-19 DIAGNOSIS — M47.817 LUMBOSACRAL SPONDYLOSIS WITHOUT MYELOPATHY: ICD-10-CM

## 2024-12-19 DIAGNOSIS — G89.4 CHRONIC PAIN SYNDROME: ICD-10-CM

## 2024-12-19 DIAGNOSIS — M47.895 OTHER SPONDYLOSIS, THORACOLUMBAR REGION: ICD-10-CM

## 2024-12-19 DIAGNOSIS — Z79.891 ENCOUNTER FOR LONG-TERM OPIATE ANALGESIC USE: ICD-10-CM

## 2024-12-19 PROCEDURE — 20553 NJX 1/MLT TRIGGER POINTS 3/>: CPT | Performed by: NURSE PRACTITIONER

## 2024-12-19 PROCEDURE — 99214 OFFICE O/P EST MOD 30 MIN: CPT | Performed by: NURSE PRACTITIONER

## 2024-12-19 RX ORDER — PREDNISONE 10 MG/1
21 TABLET ORAL DAILY
Qty: 21 EACH | Refills: 0 | Status: SHIPPED | OUTPATIENT
Start: 2024-12-19

## 2024-12-19 RX ORDER — BUPIVACAINE HYDROCHLORIDE 2.5 MG/ML
30 INJECTION, SOLUTION INFILTRATION; PERINEURAL ONCE
Status: COMPLETED | OUTPATIENT
Start: 2024-12-19 | End: 2024-12-19

## 2024-12-19 RX ORDER — TRIAMCINOLONE ACETONIDE 40 MG/ML
40 INJECTION, SUSPENSION INTRA-ARTICULAR; INTRAMUSCULAR ONCE
Status: COMPLETED | OUTPATIENT
Start: 2024-12-19 | End: 2024-12-19

## 2024-12-19 RX ORDER — PREGABALIN 75 MG/1
75 CAPSULE ORAL 3 TIMES DAILY
Qty: 90 CAPSULE | Refills: 1 | Status: SHIPPED | OUTPATIENT
Start: 2024-12-19 | End: 2025-02-17

## 2024-12-19 RX ORDER — HYDROCODONE BITARTRATE AND ACETAMINOPHEN 7.5; 325 MG/1; MG/1
1 TABLET ORAL EVERY 8 HOURS PRN
Qty: 84 TABLET | Refills: 0 | Status: SHIPPED | OUTPATIENT
Start: 2024-12-19 | End: 2025-01-16

## 2024-12-19 RX ADMIN — TRIAMCINOLONE ACETONIDE 40 MG: 40 INJECTION, SUSPENSION INTRA-ARTICULAR; INTRAMUSCULAR at 09:11

## 2024-12-19 RX ADMIN — BUPIVACAINE HYDROCHLORIDE 9 ML: 2.5 INJECTION, SOLUTION INFILTRATION; PERINEURAL at 09:10

## 2024-12-19 NOTE — PROGRESS NOTES
Medication Sig Dispense Refill    pregabalin (LYRICA) 75 MG capsule Take 1 capsule by mouth 3 times daily for 60 days. Max Daily Amount: 225 mg 90 capsule 1    HYDROcodone-acetaminophen (NORCO) 7.5-325 MG per tablet Take 1 tablet by mouth every 8 hours as needed for Pain for up to 28 days. Max Daily Amount: 3 tablets 84 tablet 0    predniSONE 10 MG (21) TBPK Take 21 tablets by mouth daily Take 6 tabs day 1, 5 tabs day 2, 4 tabs day 3, 3 tabs day 4, 2 tabs day 5, 1 tab day 6 and then done 21 each 0    hydrOXYzine HCl (ATARAX) 25 MG tablet Take 1 tablet by mouth 3 times daily as needed for Itching 90 tablet 0    amLODIPine (NORVASC) 5 MG tablet Take 1 tablet by mouth daily 90 tablet 0    DULoxetine (CYMBALTA) 60 MG extended release capsule Take 1 capsule by mouth daily 30 capsule 5    fenofibrate (TRICOR) 145 MG tablet Take 1 tablet by mouth daily 30 tablet 3    ibuprofen (ADVIL;MOTRIN) 800 MG tablet Take 1 tablet by mouth every 8 hours as needed for Pain 21 tablet 0    metoprolol succinate (TOPROL XL) 100 MG extended release tablet Take 1 tablet by mouth daily 90 tablet 0    vitamin D (ERGOCALCIFEROL) 1.25 MG (50072 UT) CAPS capsule Take 1 capsule by mouth once a week 4 capsule 5    methylPREDNISolone (MEDROL, VERONA,) 4 MG tablet Take by mouth. 1 kit 0     No current facility-administered medications for this visit.     I will continue his current medication regimen  which is part of the above treatment schedule. It has been helping with Mr. Hernandez's chronic  medical problems which for this visit include:   The primary encounter diagnosis was Myalgia. Diagnoses of Encounter for therapeutic drug monitoring, Chronic pain syndrome, Encounter for long-term opiate analgesic use, Other spondylosis, thoracolumbar region, Lumbosacral spondylosis without myelopathy, and Lumbar radiculopathy were also pertinent to this visit.   Risks and benefits of the medications and other alternative treatments  including no treatment were

## 2025-01-15 ENCOUNTER — TELEPHONE (OUTPATIENT)
Dept: PAIN MANAGEMENT | Age: 65
End: 2025-01-15

## 2025-01-15 DIAGNOSIS — Z79.891 ENCOUNTER FOR LONG-TERM OPIATE ANALGESIC USE: ICD-10-CM

## 2025-01-15 DIAGNOSIS — Z51.81 ENCOUNTER FOR THERAPEUTIC DRUG MONITORING: ICD-10-CM

## 2025-01-15 DIAGNOSIS — G89.4 CHRONIC PAIN SYNDROME: ICD-10-CM

## 2025-01-31 ENCOUNTER — OFFICE VISIT (OUTPATIENT)
Dept: PAIN MANAGEMENT | Age: 65
End: 2025-01-31

## 2025-01-31 ENCOUNTER — TELEPHONE (OUTPATIENT)
Dept: PAIN MANAGEMENT | Age: 65
End: 2025-01-31

## 2025-01-31 VITALS
SYSTOLIC BLOOD PRESSURE: 129 MMHG | DIASTOLIC BLOOD PRESSURE: 69 MMHG | BODY MASS INDEX: 28.76 KG/M2 | HEART RATE: 58 BPM | WEIGHT: 221 LBS | OXYGEN SATURATION: 98 %

## 2025-01-31 DIAGNOSIS — M79.10 MYALGIA: ICD-10-CM

## 2025-01-31 DIAGNOSIS — Z79.891 ENCOUNTER FOR LONG-TERM OPIATE ANALGESIC USE: ICD-10-CM

## 2025-01-31 DIAGNOSIS — M47.817 LUMBOSACRAL SPONDYLOSIS WITHOUT MYELOPATHY: Primary | ICD-10-CM

## 2025-01-31 DIAGNOSIS — Z51.81 ENCOUNTER FOR THERAPEUTIC DRUG MONITORING: ICD-10-CM

## 2025-01-31 DIAGNOSIS — M47.895 OTHER SPONDYLOSIS, THORACOLUMBAR REGION: ICD-10-CM

## 2025-01-31 DIAGNOSIS — G89.4 CHRONIC PAIN SYNDROME: ICD-10-CM

## 2025-01-31 DIAGNOSIS — M54.16 LUMBAR RADICULOPATHY: ICD-10-CM

## 2025-01-31 PROCEDURE — 99213 OFFICE O/P EST LOW 20 MIN: CPT | Performed by: NURSE PRACTITIONER

## 2025-01-31 RX ORDER — PREGABALIN 75 MG/1
75 CAPSULE ORAL 3 TIMES DAILY
Qty: 90 CAPSULE | Refills: 1 | Status: SHIPPED | OUTPATIENT
Start: 2025-01-31 | End: 2025-04-01

## 2025-01-31 RX ORDER — HYDROCODONE BITARTRATE AND ACETAMINOPHEN 7.5; 325 MG/1; MG/1
1 TABLET ORAL EVERY 8 HOURS PRN
Qty: 84 TABLET | Refills: 0 | Status: SHIPPED | OUTPATIENT
Start: 2025-01-31 | End: 2025-02-28

## 2025-01-31 NOTE — TELEPHONE ENCOUNTER
Submitted PA for Hydrocodone-Acetaminophen Via [facility] Key: [id number] STATUS: APPROVED    Authorization Expiration Date: 7/30/2025.    [email or web address] Thank you!

## 2025-01-31 NOTE — PROGRESS NOTES
Arnel Hernandez  1960  [id number]      HISTORY OF PRESENT ILLNESS: Mr. Hernandez is a 64 y.o. male returns for a follow up visit for pain management  He has a diagnosis of   1. Lumbosacral spondylosis without myelopathy    2. Encounter for therapeutic drug monitoring    3. Chronic pain syndrome    4. Encounter for long-term opiate analgesic use    5. Other spondylosis, thoracolumbar region    6. Lumbar radiculopathy    7. Myalgia    .      New Medications since Last Office visit have been reviewed with patient.     As per Information Obtained from the PADT (Patient Assessment and Documentation Tool)    He complains of pain in the lower back, buttocks, right leg. He rates the pain 7/10 and describes it as sharp, aching, burning, pins and needles. Current treatment regimen has helped relieve about 70% of the pain since beginning treatment plan.  He denies any side effects from the current pain regimen. Patient reports that since implementation of their treatment plan; their physical functioning is unchanged, family/social relationships are better, mood is better sleep patterns are better, and that the overall functioning is better.  Patient denies/admits that any of the above have changed since last office visit. Patient denies misusing/abusing his narcotic pain medications or using any illegal drugs.      Upon obtaining medical history from Mr. Hernandez    ALLERGIES: Patients list of allergies were reviewed     MEDICATIONS: Mr. Hernandez list of medications were reviewed.His current medications are   Outpatient Medications Prior to Visit   Medication Sig Dispense Refill    amLODIPine (NORVASC) 5 MG tablet Take 1 tablet by mouth daily 90 tablet 0    DULoxetine (CYMBALTA) 60 MG extended release capsule Take 1 capsule by mouth daily 30 capsule 5    fenofibrate (TRICOR) 145 MG tablet Take 1 tablet by mouth daily 30 tablet 3    ibuprofen (ADVIL;MOTRIN) 800 MG tablet Take 1 tablet by mouth every 8 hours as needed for Pain

## 2025-03-14 ENCOUNTER — OFFICE VISIT (OUTPATIENT)
Dept: PAIN MANAGEMENT | Age: 65
End: 2025-03-14

## 2025-03-14 VITALS
WEIGHT: 221 LBS | SYSTOLIC BLOOD PRESSURE: 117 MMHG | DIASTOLIC BLOOD PRESSURE: 69 MMHG | HEART RATE: 45 BPM | BODY MASS INDEX: 28.76 KG/M2 | OXYGEN SATURATION: 95 %

## 2025-03-14 DIAGNOSIS — M54.16 LUMBAR RADICULOPATHY: ICD-10-CM

## 2025-03-14 DIAGNOSIS — Z51.81 ENCOUNTER FOR THERAPEUTIC DRUG MONITORING: ICD-10-CM

## 2025-03-14 DIAGNOSIS — M47.895 OTHER SPONDYLOSIS, THORACOLUMBAR REGION: ICD-10-CM

## 2025-03-14 DIAGNOSIS — M79.10 MYALGIA: ICD-10-CM

## 2025-03-14 DIAGNOSIS — Z79.891 ENCOUNTER FOR LONG-TERM OPIATE ANALGESIC USE: ICD-10-CM

## 2025-03-14 DIAGNOSIS — M47.817 LUMBOSACRAL SPONDYLOSIS WITHOUT MYELOPATHY: Primary | ICD-10-CM

## 2025-03-14 DIAGNOSIS — G89.4 CHRONIC PAIN SYNDROME: ICD-10-CM

## 2025-03-14 PROCEDURE — 99213 OFFICE O/P EST LOW 20 MIN: CPT | Performed by: NURSE PRACTITIONER

## 2025-03-14 RX ORDER — PREGABALIN 75 MG/1
75 CAPSULE ORAL 3 TIMES DAILY
Qty: 90 CAPSULE | Refills: 1 | Status: SHIPPED | OUTPATIENT
Start: 2025-03-14 | End: 2025-05-13

## 2025-03-14 RX ORDER — HYDROCODONE BITARTRATE AND ACETAMINOPHEN 7.5; 325 MG/1; MG/1
1 TABLET ORAL EVERY 8 HOURS PRN
Qty: 84 TABLET | Refills: 0 | Status: SHIPPED | OUTPATIENT
Start: 2025-03-14 | End: 2025-04-11

## 2025-03-14 NOTE — PROGRESS NOTES
Arnel Hernandez  1960  [id number]      HISTORY OF PRESENT ILLNESS: Mr. Hernandez is a 64 y.o. male returns for a follow up visit for pain management  He has a diagnosis of   1. Lumbosacral spondylosis without myelopathy    2. Encounter for therapeutic drug monitoring    3. Chronic pain syndrome    4. Encounter for long-term opiate analgesic use    5. Other spondylosis, thoracolumbar region    6. Lumbar radiculopathy    7. Myalgia    .      New Medications since Last Office visit have been reviewed with patient.     As per Information Obtained from the PADT (Patient Assessment and Documentation Tool)    He complains of pain in the lower back radiating to right foot. He rates the pain 8/10 and describes it as aching. Current treatment regimen has helped relieve about 60% of the pain since beginning treatment plan.  He denies any side effects from the current pain regimen. Patient reports that since implementation of their treatment plan; their physical functioning is better, family/social relationships are better, mood is better sleep patterns are worse, and that the overall functioning is better.  Patient denies/admits that any of the above have changed since last office visit. Patient denies misusing/abusing his narcotic pain medications or using any illegal drugs.      Upon obtaining medical history from Mr. Hernandez    ALLERGIES: Patients list of allergies were reviewed     MEDICATIONS: Mr. Hernandez list of medications were reviewed.His current medications are   Outpatient Medications Prior to Visit   Medication Sig Dispense Refill    pregabalin (LYRICA) 75 MG capsule Take 1 capsule by mouth 3 times daily for 60 days. Max Daily Amount: 225 mg 90 capsule 1    amLODIPine (NORVASC) 5 MG tablet Take 1 tablet by mouth daily 90 tablet 0    DULoxetine (CYMBALTA) 60 MG extended release capsule Take 1 capsule by mouth daily 30 capsule 5    fenofibrate (TRICOR) 145 MG tablet Take 1 tablet by mouth daily 30 tablet 3

## 2025-04-25 ENCOUNTER — OFFICE VISIT (OUTPATIENT)
Dept: PAIN MANAGEMENT | Age: 65
End: 2025-04-25
Payer: COMMERCIAL

## 2025-04-25 VITALS
OXYGEN SATURATION: 96 % | BODY MASS INDEX: 29.29 KG/M2 | SYSTOLIC BLOOD PRESSURE: 126 MMHG | WEIGHT: 222 LBS | DIASTOLIC BLOOD PRESSURE: 64 MMHG | HEART RATE: 45 BPM

## 2025-04-25 DIAGNOSIS — Z51.81 ENCOUNTER FOR THERAPEUTIC DRUG MONITORING: ICD-10-CM

## 2025-04-25 DIAGNOSIS — Z79.891 ENCOUNTER FOR LONG-TERM OPIATE ANALGESIC USE: ICD-10-CM

## 2025-04-25 DIAGNOSIS — M47.895 OTHER SPONDYLOSIS, THORACOLUMBAR REGION: ICD-10-CM

## 2025-04-25 DIAGNOSIS — M54.16 LUMBAR RADICULOPATHY: ICD-10-CM

## 2025-04-25 DIAGNOSIS — M47.817 LUMBOSACRAL SPONDYLOSIS WITHOUT MYELOPATHY: Primary | ICD-10-CM

## 2025-04-25 DIAGNOSIS — G89.4 CHRONIC PAIN SYNDROME: ICD-10-CM

## 2025-04-25 DIAGNOSIS — M79.10 MYALGIA: ICD-10-CM

## 2025-04-25 PROCEDURE — 99214 OFFICE O/P EST MOD 30 MIN: CPT | Performed by: NURSE PRACTITIONER

## 2025-04-25 RX ORDER — PREGABALIN 75 MG/1
75 CAPSULE ORAL 3 TIMES DAILY
Qty: 90 CAPSULE | Refills: 1 | Status: SHIPPED | OUTPATIENT
Start: 2025-04-25 | End: 2025-06-24

## 2025-04-25 RX ORDER — METHYLPREDNISOLONE 4 MG/1
TABLET ORAL
Qty: 21 TABLET | Refills: 0 | Status: SHIPPED | OUTPATIENT
Start: 2025-04-25 | End: 2025-05-01

## 2025-04-25 RX ORDER — HYDROCODONE BITARTRATE AND ACETAMINOPHEN 7.5; 325 MG/1; MG/1
1 TABLET ORAL EVERY 8 HOURS PRN
Qty: 84 TABLET | Refills: 0 | Status: SHIPPED | OUTPATIENT
Start: 2025-04-25 | End: 2025-05-23

## 2025-04-25 NOTE — PROGRESS NOTES
Arnel Hernandez  1960  [id number]      HISTORY OF PRESENT ILLNESS: Mr. Hernandez is a 64 y.o. male returns for a follow up visit for pain management  He has a diagnosis of   1. Lumbosacral spondylosis without myelopathy    2. Encounter for therapeutic drug monitoring    3. Chronic pain syndrome    4. Encounter for long-term opiate analgesic use    5. Other spondylosis, thoracolumbar region    6. Lumbar radiculopathy    7. Myalgia    .      New Medications since Last Office visit have been reviewed with patient.     As per Information Obtained from the PADT (Patient Assessment and Documentation Tool)    He complains of pain in the lower back radiating to right foot. He rates the pain 8/10 and describes it as aching. Current treatment regimen has helped relieve about 60% of the pain since beginning treatment plan.  He denies any side effects from the current pain regimen. Patient reports that since implementation of their treatment plan; their physical functioning is better, family/social relationships are better, mood is better sleep patterns are better, and that the overall functioning is better.  Patient denies/admits that any of the above have changed since last office visit. Patient denies misusing/abusing his narcotic pain medications or using any illegal drugs.      Upon obtaining medical history from Mr. Hernandez    ALLERGIES: Patients list of allergies were reviewed     MEDICATIONS: Mr. Hernandez list of medications were reviewed.His current medications are   Outpatient Medications Prior to Visit   Medication Sig Dispense Refill    DULoxetine (CYMBALTA) 60 MG extended release capsule Take 1 capsule by mouth daily 30 capsule 5    fenofibrate (TRICOR) 145 MG tablet Take 1 tablet by mouth daily 30 tablet 3    ibuprofen (ADVIL;MOTRIN) 800 MG tablet Take 1 tablet by mouth every 8 hours as needed for Pain 21 tablet 0    metoprolol succinate (TOPROL XL) 100 MG extended release tablet Take 1 tablet by mouth daily 90

## 2025-04-29 LAB
1,3 CHLOROPHENYL PIPERAZINE, URINE: NOT DETECTED
6-MONOACETYLMORPHINE: NEGATIVE NG/ML
7-AMINOCLONAZEPAM/CREATININE URINE: NOT DETECTED NG/MG CREAT
8-HYDROXYAMOXAPINE, URINE: NOT DETECTED
8-HYDROXYLOXAPINE, URINE: NOT DETECTED
ACETAMINOPHEN, URINE: NORMAL UG/ML
ACETAMINOPHEN, URINE: PRESENT
ALPHA HYDROXYALPRAZOLAM/CREATININE URINE: NOT DETECTED NG/MG CREAT
ALPHA HYDROXYTRIAZOLAM/CREAT UR CFM: NOT DETECTED NG/MG CREAT
ALPHA-HYDROXYMIDAZOLAM, URINE: NOT DETECTED NG/MG CREAT
ALPRAZOLAM/CREATININE URINE: NOT DETECTED NG/MG CREAT
AMINO CHLOROPYRIDINE, URINE: NOT DETECTED
AMITRIPTYLINE: NOT DETECTED
AMOXAPINE, URINE: NOT DETECTED
AMPHETAMINE SCREEN URINE: NEGATIVE NG/ML
ANALGESICS SCREEN URINE: NORMAL
ANTIDEPRESSANTS SCREEN URINE: NORMAL
ANTIPSYCHOTICS SCREEN URINE: NEGATIVE
ARIPIPRAZOLE, URINE: NOT DETECTED
ASENAPINE, URINE: NOT DETECTED
BACLOFEN, URINE: NOT DETECTED
BARBITURATE SCREEN URINE: NEGATIVE NG/ML
BENZODIAZEPINE SCREEN, URINE: NEGATIVE
BUPRENORPHINE URINE: NEGATIVE
BUPRENORPHINE/CREATININE URINE: NOT DETECTED NG/MG CREAT
BUPROPION, URINE: NOT DETECTED
CANNABINOID SCREEN URINE: NEGATIVE NG/ML
CARBAMAZEPINE, URINE: NOT DETECTED
CARISOPRODOL, UR: NOT DETECTED
CHLORPROMAZINE, URINE: NOT DETECTED
CITALOPRAM, URINE: NOT DETECTED
CLOBAZAM, URINE: NOT DETECTED
CLOMIPRAMINE, URINE: NOT DETECTED
CLONAZEPAM/CREATININE URINE: NOT DETECTED NG/MG CREAT
CLOZAPINE, URINE: NOT DETECTED
COCAINE METABOLITE SCREEN URINE: NEGATIVE NG/ML
CODEINE, URINE: NOT DETECTED NG/MG CREAT
CREATININE URINE: 176 MG/DL
CYCLOBENZAPRINE, URINE: NOT DETECTED
DESALKYLFLURAZEPAM/CREATININE URINE: NOT DETECTED NG/MG CREAT
DESIPRAMINE: NOT DETECTED
DESMETHYLCITALOPRAM, URINE: NOT DETECTED
DESMETHYLCLOMIPRAMINE, URINE: NOT DETECTED
DESMETHYLCLOZAPINE, URINE: NOT DETECTED
DESMETHYLCYCLOBENZAPRINE, URINE: NOT DETECTED
DESMETHYLDOXEPIN, URINE: NOT DETECTED
DESMETHYLFLUNITRAZEPAM, URINE: NOT DETECTED NG/MG CREAT
DESMETHYLSERTRALINE, URINE: NOT DETECTED
DESMETHYLVENLAFAXINE, URINE: NOT DETECTED
DIAZEPAM/CREATININE URINE: NOT DETECTED NG/MG CREAT
DIHYDROCODEINE/CREATININE URINE: 93 NG/MG CREAT
DOXEPIN, URINE: NOT DETECTED
DULOXETINE, URINE: PRESENT
EZOGABINE, URINE: NOT DETECTED
FENTANYL / ANALOGUES SCREEN URINE: NEGATIVE
FENTANYL/CREATININE URINE: NOT DETECTED NG/MG CREAT
FLUNITRAZEPAM, URINE: NOT DETECTED NG/MG CREAT
FLUOXETINE, URINE: NOT DETECTED
FLUPHENAZINE, URINE: NOT DETECTED
FLUVOXAMINE, URINE: NOT DETECTED
GABAPENTIN: NOT DETECTED
HALOPERIDOL, URINE: NOT DETECTED
HYDROCODONE GC/MS CONF: 1017 NG/MG CREAT
HYDROMORPHONE GC/MS CONF: 125 NG/MG CREAT
HYDROXYBUPROPION, URINE: NOT DETECTED
ILOPERIDONE, URINE: NOT DETECTED
IMIPRAMINE, URINE: NOT DETECTED
KETAMINE, URINE: NOT DETECTED
LABORATORY REPORT: NORMAL
LAMOTRIGINE, URINE: NOT DETECTED
LEVETIRACETAM, URINE: NOT DETECTED
LORAZEPAM/CREATININE URINE: NOT DETECTED NG/MG CREAT
LOXAPINE, URINE: NOT DETECTED
LURASIDONE, URINE: NOT DETECTED
MAPROTILINE, URINE: NOT DETECTED
MEPERIDINE: NEGATIVE NG/ML
MEPROBAMATE, URINE: NOT DETECTED
MESORIDAZINE, URINE: NOT DETECTED
METAXALONE, URINE: NOT DETECTED
METHADONE AND METABOLITES, URINE: NEGATIVE NG/ML
METHADONE SCREEN, URINE: NEGATIVE NG/ML
METHOCARBAMOL, URINE: NOT DETECTED
METHYLPHENIDATE, URINE: NOT DETECTED
MIDAZOLAM/CREATININE URINE: NOT DETECTED NG/MG CREAT
MIRTAZAPINE, URINE: NOT DETECTED
MOLINDONE, URINE: NOT DETECTED
MORPHINE GC/MS CONF: NOT DETECTED NG/MG CREAT
MUSCLE RELAXANTS SCREEN URINE: NEGATIVE
NEFAZODONE, URINE: NOT DETECTED
NORBUPRENORPHINE/CREATININE URINE: NOT DETECTED NG/MG CREAT
NORCODEINE/CREATININE URINE: NOT DETECTED NG/MG CREAT
NORDIAZEPAM/CREATININE URINE: NOT DETECTED NG/MG CREAT
NORFENTANYL/CREATININE URINE: NOT DETECTED NG/MG CREAT
NORFLUOXETINE, URINE: NOT DETECTED
NORHYDROCODONE (LCMSMS): 415 NG/MG CREAT
NORKETAMINE, URINE: NOT DETECTED
NORMORPHINE URINE: NOT DETECTED NG/MG CREAT
NORTRIPTYLINE: NOT DETECTED
OLANZAPINE, URINE: NOT DETECTED
OPIATE SCREEN URINE: NORMAL NG/ML
OPIATES: NORMAL
ORPHENADRINE, URINE: NOT DETECTED
OTHER HALLUCINOGENS SCREEN URINE: NEGATIVE
OXAZEPAM/CREATININE URINE: NOT DETECTED NG/MG CREAT
OXCARBAZEPINE MHD, URINE: NOT DETECTED
OXYCODONE SCREEN URINE: NEGATIVE NG/ML
PAROXETINE, URINE: NOT DETECTED
PERPHENAZINE, URINE: NOT DETECTED
PHENCYCLIDINE SCREEN URINE: NEGATIVE NG/ML
PHENYTOIN, URINE: NOT DETECTED
PIMOZIDE, URINE: NOT DETECTED
PREGABALIN, URINE: PRESENT
PREGABALIN: NORMAL
PRESCRIBED MEDICATIONS: NORMAL
PRIMIDONE, URINE: NOT DETECTED
PROCHLORPERAZINE, URINE: NOT DETECTED
PROPOXYPHENE: NEGATIVE NG/ML
PROTRIPTYLINE, URINE: NOT DETECTED
QUETIAPINE, URINE: NOT DETECTED
RISPERIDONE, URINE: NOT DETECTED
RITALINIC ACID, UR: NOT DETECTED
RUFINAMIDE, URINE: NOT DETECTED
SEDATIVES/HYPNOTICS SCREEN URINE: NEGATIVE
SERTRALINE, URINE: NOT DETECTED
SYMPATHOMIMETICS SCREEN URINE: NEGATIVE
TAPENTADOL SCREEN URINE: NEGATIVE NG/ML
TEMAZEPAM/CREATININE URINE: NOT DETECTED NG/MG CREAT
THIORIDAZINE, URINE: NOT DETECTED
THIOTHIXENE, URINE: NOT DETECTED
TIAGABINE, URINE: NOT DETECTED
TIZANIDINE, URINE: NOT DETECTED
TOPIRAMATE, URINE: NOT DETECTED
TRAMADOL SCREEN URINE: NEGATIVE NG/ML
TRAZODONE, URINE: NOT DETECTED
TRIFLUOPERAZINE, URINE: NOT DETECTED
TRIMIPRAMINE, URINE: NOT DETECTED
VENLAFAXINE, URINE: NOT DETECTED
VILAZODONE, URINE: NOT DETECTED
ZALEPLON, URINE: NOT DETECTED
ZIPRASIDONE, URINE: NOT DETECTED
ZOLPIDEM ACID, URINE: NOT DETECTED
ZOLPIDEM, URINE: NOT DETECTED
ZONISAMIDE, URINE: NOT DETECTED
ZOPICLONE/ESZOPICLONE, URINE: NOT DETECTED

## 2025-06-12 ENCOUNTER — OFFICE VISIT (OUTPATIENT)
Dept: PAIN MANAGEMENT | Age: 65
End: 2025-06-12

## 2025-06-12 VITALS
SYSTOLIC BLOOD PRESSURE: 116 MMHG | OXYGEN SATURATION: 99 % | HEART RATE: 49 BPM | DIASTOLIC BLOOD PRESSURE: 64 MMHG | WEIGHT: 223 LBS | BODY MASS INDEX: 29.42 KG/M2

## 2025-06-12 DIAGNOSIS — Z79.891 ENCOUNTER FOR LONG-TERM OPIATE ANALGESIC USE: ICD-10-CM

## 2025-06-12 DIAGNOSIS — M54.16 LUMBAR RADICULOPATHY: ICD-10-CM

## 2025-06-12 DIAGNOSIS — Z51.81 ENCOUNTER FOR THERAPEUTIC DRUG MONITORING: ICD-10-CM

## 2025-06-12 DIAGNOSIS — M79.10 MYALGIA: ICD-10-CM

## 2025-06-12 DIAGNOSIS — M47.895 OTHER SPONDYLOSIS, THORACOLUMBAR REGION: Primary | ICD-10-CM

## 2025-06-12 DIAGNOSIS — G89.4 CHRONIC PAIN SYNDROME: ICD-10-CM

## 2025-06-12 DIAGNOSIS — M47.817 LUMBOSACRAL SPONDYLOSIS WITHOUT MYELOPATHY: ICD-10-CM

## 2025-06-12 RX ORDER — PREGABALIN 75 MG/1
75 CAPSULE ORAL 3 TIMES DAILY
Qty: 90 CAPSULE | Refills: 1 | Status: SHIPPED | OUTPATIENT
Start: 2025-06-12 | End: 2025-08-11

## 2025-06-12 RX ORDER — BUPIVACAINE HYDROCHLORIDE 2.5 MG/ML
9 INJECTION, SOLUTION INFILTRATION; PERINEURAL ONCE
Status: COMPLETED | OUTPATIENT
Start: 2025-06-12 | End: 2025-06-12

## 2025-06-12 RX ORDER — HYDROCODONE BITARTRATE AND ACETAMINOPHEN 7.5; 325 MG/1; MG/1
1 TABLET ORAL EVERY 8 HOURS PRN
Qty: 84 TABLET | Refills: 0 | Status: SHIPPED | OUTPATIENT
Start: 2025-06-12 | End: 2025-07-10

## 2025-06-12 RX ORDER — TRIAMCINOLONE ACETONIDE 40 MG/ML
40 INJECTION, SUSPENSION INTRA-ARTICULAR; INTRAMUSCULAR ONCE
Status: COMPLETED | OUTPATIENT
Start: 2025-06-12 | End: 2025-06-12

## 2025-06-12 RX ADMIN — TRIAMCINOLONE ACETONIDE 40 MG: 40 INJECTION, SUSPENSION INTRA-ARTICULAR; INTRAMUSCULAR at 10:05

## 2025-06-12 RX ADMIN — BUPIVACAINE HYDROCHLORIDE 22.5 MG: 2.5 INJECTION, SOLUTION INFILTRATION; PERINEURAL at 10:05

## 2025-06-12 NOTE — PROGRESS NOTES
Arnel Hernandez  1960  [id number]      HISTORY OF PRESENT ILLNESS: Mr. Hernandez is a 64 y.o. male returns for a follow up visit for pain management  He has a diagnosis of   1. Other spondylosis, thoracolumbar region    2. Chronic pain syndrome    3. Lumbosacral spondylosis without myelopathy    4. Encounter for long-term opiate analgesic use    5. Lumbar radiculopathy    6. Myalgia    7. Encounter for therapeutic drug monitoring    .      New Medications since Last Office visit have been reviewed with patient.     As per Information Obtained from the PADT (Patient Assessment and Documentation Tool)    He complains of pain in the buttocks, right leg and foot. He rates the pain 8/10 and describes it as aching. Current treatment regimen has helped relieve about 60% of the pain since beginning treatment plan.  He denies any side effects from the current pain regimen. Patient reports that since implementation of their treatment plan; their physical functioning is better, family/social relationships are better, mood is better sleep patterns are better, and that the overall functioning is better.  Patient denies/admits that any of the above have changed since last office visit. Patient denies misusing/abusing his narcotic pain medications or using any illegal drugs.      Upon obtaining medical history from Mr. Hernandez    ALLERGIES: Patients list of allergies were reviewed     MEDICATIONS: Mr. Hernandez list of medications were reviewed.His current medications are   Outpatient Medications Prior to Visit   Medication Sig Dispense Refill    pregabalin (LYRICA) 75 MG capsule Take 1 capsule by mouth 3 times daily for 60 days. Max Daily Amount: 225 mg 90 capsule 1    DULoxetine (CYMBALTA) 60 MG extended release capsule Take 1 capsule by mouth daily 30 capsule 5    fenofibrate (TRICOR) 145 MG tablet Take 1 tablet by mouth daily 30 tablet 3    ibuprofen (ADVIL;MOTRIN) 800 MG tablet Take 1 tablet by mouth every 8 hours as needed

## 2025-07-01 ENCOUNTER — TELEPHONE (OUTPATIENT)
Dept: PAIN MANAGEMENT | Age: 65
End: 2025-07-01

## 2025-07-24 ENCOUNTER — OFFICE VISIT (OUTPATIENT)
Dept: PAIN MANAGEMENT | Age: 65
End: 2025-07-24

## 2025-07-24 VITALS
SYSTOLIC BLOOD PRESSURE: 134 MMHG | WEIGHT: 221 LBS | BODY MASS INDEX: 29.16 KG/M2 | OXYGEN SATURATION: 97 % | DIASTOLIC BLOOD PRESSURE: 62 MMHG | HEART RATE: 45 BPM

## 2025-07-24 DIAGNOSIS — Z79.891 ENCOUNTER FOR LONG-TERM OPIATE ANALGESIC USE: ICD-10-CM

## 2025-07-24 DIAGNOSIS — M47.895 OTHER SPONDYLOSIS, THORACOLUMBAR REGION: ICD-10-CM

## 2025-07-24 DIAGNOSIS — M54.16 LUMBAR RADICULOPATHY: ICD-10-CM

## 2025-07-24 DIAGNOSIS — Z51.81 ENCOUNTER FOR THERAPEUTIC DRUG MONITORING: ICD-10-CM

## 2025-07-24 DIAGNOSIS — M79.10 MYALGIA: ICD-10-CM

## 2025-07-24 DIAGNOSIS — M47.817 LUMBOSACRAL SPONDYLOSIS WITHOUT MYELOPATHY: Primary | ICD-10-CM

## 2025-07-24 DIAGNOSIS — G89.4 CHRONIC PAIN SYNDROME: ICD-10-CM

## 2025-07-24 PROCEDURE — 99213 OFFICE O/P EST LOW 20 MIN: CPT | Performed by: NURSE PRACTITIONER

## 2025-07-24 RX ORDER — HYDROCODONE BITARTRATE AND ACETAMINOPHEN 7.5; 325 MG/1; MG/1
1 TABLET ORAL EVERY 8 HOURS PRN
Qty: 84 TABLET | Refills: 0 | Status: SHIPPED | OUTPATIENT
Start: 2025-07-24 | End: 2025-08-21

## 2025-07-24 RX ORDER — MAGNESIUM OXIDE 400 MG/1
400 TABLET ORAL 2 TIMES DAILY
Qty: 60 TABLET | Refills: 0 | Status: SHIPPED | OUTPATIENT
Start: 2025-07-24 | End: 2025-08-23

## 2025-07-24 RX ORDER — PREGABALIN 75 MG/1
75 CAPSULE ORAL 3 TIMES DAILY
Qty: 90 CAPSULE | Refills: 1 | Status: SHIPPED | OUTPATIENT
Start: 2025-07-24 | End: 2025-09-22

## 2025-07-24 NOTE — PROGRESS NOTES
capsule by mouth once a week 4 capsule 5    fenofibrate (TRICOR) 145 MG tablet Take 1 tablet by mouth daily 30 tablet 3    amLODIPine (NORVASC) 5 MG tablet Take 1 tablet by mouth daily 90 tablet 0    ibuprofen (ADVIL;MOTRIN) 800 MG tablet Take 1 tablet by mouth every 8 hours as needed for Pain 21 tablet 0     No current facility-administered medications for this visit.     I will continue his current medication regimen  which is part of the above treatment schedule. It has been helping with Mr. Hernandez's chronic  medical problems which for this visit include:   The primary encounter diagnosis was Lumbosacral spondylosis without myelopathy. Diagnoses of Chronic pain syndrome, Other spondylosis, thoracolumbar region, Encounter for long-term opiate analgesic use, Lumbar radiculopathy, Myalgia, and Encounter for therapeutic drug monitoring were also pertinent to this visit.   Risks and benefits of the medications and other alternative treatments  including no treatment were discussed with the patient.The common side effects of these medications were also explained to the patient.  Informed verbal consent was obtained.   Goals of current treatment regimen include:   *Improvement in pain by using the least amount of medication therapy to be satisfactorily functional.    *To restore functioning with focus on improvement in physical performance, general activity, ADLs, work or disability, emotional distress   *Patient was educated on and understands the limitations of opiates regarding their inability to remove pain long term.   Will continue to monitor progress towards achieving/maintaining therapeutic goals with special emphasis on  1. Improvement in perceived interfernce of pain with ADL's. Ability to do home exercises independently. Ability to do household chores indoor and/or outdoor work and social and leisure activities.Improve psychosocial and physical functioning. he is showing progression towards this treatment

## 2025-09-05 ENCOUNTER — OFFICE VISIT (OUTPATIENT)
Dept: PAIN MANAGEMENT | Age: 65
End: 2025-09-05
Payer: COMMERCIAL

## 2025-09-05 VITALS
BODY MASS INDEX: 29.55 KG/M2 | DIASTOLIC BLOOD PRESSURE: 66 MMHG | SYSTOLIC BLOOD PRESSURE: 126 MMHG | HEART RATE: 47 BPM | WEIGHT: 224 LBS | OXYGEN SATURATION: 99 %

## 2025-09-05 DIAGNOSIS — M79.10 MYALGIA: ICD-10-CM

## 2025-09-05 DIAGNOSIS — M47.895 OTHER SPONDYLOSIS, THORACOLUMBAR REGION: ICD-10-CM

## 2025-09-05 DIAGNOSIS — Z79.891 ENCOUNTER FOR LONG-TERM OPIATE ANALGESIC USE: ICD-10-CM

## 2025-09-05 DIAGNOSIS — M47.817 LUMBOSACRAL SPONDYLOSIS WITHOUT MYELOPATHY: ICD-10-CM

## 2025-09-05 DIAGNOSIS — M54.16 LUMBAR RADICULOPATHY: Primary | ICD-10-CM

## 2025-09-05 DIAGNOSIS — G89.4 CHRONIC PAIN SYNDROME: ICD-10-CM

## 2025-09-05 DIAGNOSIS — Z51.81 ENCOUNTER FOR THERAPEUTIC DRUG MONITORING: ICD-10-CM

## 2025-09-05 PROCEDURE — 99214 OFFICE O/P EST MOD 30 MIN: CPT | Performed by: NURSE PRACTITIONER

## 2025-09-05 RX ORDER — HYDROCODONE BITARTRATE AND ACETAMINOPHEN 7.5; 325 MG/1; MG/1
1 TABLET ORAL EVERY 8 HOURS PRN
Qty: 84 TABLET | Refills: 0 | Status: SHIPPED | OUTPATIENT
Start: 2025-09-05 | End: 2025-10-03

## 2025-09-05 RX ORDER — MAGNESIUM OXIDE 400 MG/1
400 TABLET ORAL 2 TIMES DAILY
Qty: 60 TABLET | Refills: 1 | Status: SHIPPED | OUTPATIENT
Start: 2025-09-05 | End: 2025-11-04

## 2025-09-05 RX ORDER — PREGABALIN 75 MG/1
75 CAPSULE ORAL 3 TIMES DAILY
Qty: 90 CAPSULE | Refills: 1 | Status: SHIPPED | OUTPATIENT
Start: 2025-09-05 | End: 2025-11-04